# Patient Record
Sex: FEMALE | Race: WHITE | NOT HISPANIC OR LATINO | Employment: OTHER | URBAN - NONMETROPOLITAN AREA
[De-identification: names, ages, dates, MRNs, and addresses within clinical notes are randomized per-mention and may not be internally consistent; named-entity substitution may affect disease eponyms.]

---

## 2018-05-21 ENCOUNTER — OUTSIDE FACILITY SERVICE (OUTPATIENT)
Dept: CARDIOLOGY | Facility: CLINIC | Age: 73
End: 2018-05-21

## 2018-05-21 PROCEDURE — 93227 XTRNL ECG REC<48 HR R&I: CPT | Performed by: INTERNAL MEDICINE

## 2018-06-01 ENCOUNTER — OFFICE VISIT (OUTPATIENT)
Dept: CARDIOLOGY | Facility: CLINIC | Age: 73
End: 2018-06-01

## 2018-06-01 VITALS
HEART RATE: 63 BPM | BODY MASS INDEX: 32.96 KG/M2 | SYSTOLIC BLOOD PRESSURE: 136 MMHG | OXYGEN SATURATION: 98 % | WEIGHT: 210 LBS | DIASTOLIC BLOOD PRESSURE: 82 MMHG | HEIGHT: 67 IN

## 2018-06-01 DIAGNOSIS — E78.00 HYPERCHOLESTEROLEMIA: ICD-10-CM

## 2018-06-01 DIAGNOSIS — R94.31 ABNORMAL EKG: Primary | ICD-10-CM

## 2018-06-01 DIAGNOSIS — I47.1 SUPRAVENTRICULAR TACHYCARDIA (HCC): ICD-10-CM

## 2018-06-01 DIAGNOSIS — I47.29 NONSUSTAINED VENTRICULAR TACHYCARDIA (HCC): ICD-10-CM

## 2018-06-01 DIAGNOSIS — R06.09 DYSPNEA ON EXERTION: ICD-10-CM

## 2018-06-01 PROCEDURE — 99204 OFFICE O/P NEW MOD 45 MIN: CPT | Performed by: NURSE PRACTITIONER

## 2018-06-01 PROCEDURE — 93000 ELECTROCARDIOGRAM COMPLETE: CPT | Performed by: NURSE PRACTITIONER

## 2018-06-01 NOTE — PROGRESS NOTES
Subjective:     Encounter Date:06/01/2018    Chief Complaint:    Patient ID: Ruth Ramsay is a 72 y.o. female referred here today by Pema Kong NP for cardiac evaluation of shortness of air and an abnormal Holter monitor.    Shortness of Breath   This is a new problem. The current episode started more than 1 year ago. The problem occurs intermittently. The problem has been gradually worsening. Associated symptoms include headaches and neck pain. Pertinent negatives include no abdominal pain, chest pain, fever, leg swelling, rash or sputum production. The symptoms are aggravated by exercise. The patient has no known risk factors for DVT/PE. She has tried nothing for the symptoms.   Heart Problem   This is a new problem. The current episode started 1 to 4 weeks ago. The problem occurs intermittently. The problem has been unchanged. Associated symptoms include headaches and neck pain. Pertinent negatives include no abdominal pain, chest pain, chills, congestion, coughing, fever, rash or weakness. Nothing aggravates the symptoms. She has tried nothing for the symptoms. The treatment provided no relief.       HPI     Shortness of Breath    Additional comments: mild, with exertion           Irregular Heart Beat    Additional comments: Holter monitor with nonsustained VT and nonsustained SVT and atrial and ventricular premature beats       Last edited by KERLINE Neville on 6/1/2018  9:39 AM. (History)        History:   Past Medical History:   Diagnosis Date   • Acid reflux    • Arrhythmia    • Cardiac arrhythmia    • Cataract    • Dizziness and giddiness    • Hypercholesterolemia    • Nonsustained ventricular tachycardia    • Supraventricular tachycardia      Past Surgical History:   Procedure Laterality Date   • BUNIONECTOMY     • CATARACT EXTRACTION, BILATERAL     • FINGER/THUMB ARTHROPLASTY      thumb surgery   • HYSTERECTOMY     • REPLACEMENT TOTAL KNEE Right      Social History     Social History    • Marital status:      Spouse name: N/A   • Number of children: N/A   • Years of education: N/A     Occupational History   • Not on file.     Social History Main Topics   • Smoking status: Former Smoker     Packs/day: 1.00     Years: 12.00     Types: Cigarettes     Start date: 1966     Quit date: 6/1/1978   • Smokeless tobacco: Never Used      Comment: exposed to 2nd hand smoke at work   • Alcohol use 1.8 oz/week     2 Cans of beer, 1 Shots of liquor per week   • Drug use: No   • Sexual activity: Not Currently     Partners: Male     Other Topics Concern   • Not on file     Social History Narrative   • No narrative on file     Family History   Problem Relation Age of Onset   • Kidney disease Mother    • COPD Brother    • Lung cancer Maternal Aunt    • Dementia Maternal Grandmother    • Allergic rhinitis Brother    • Osteoarthritis Brother        Outpatient Prescriptions Marked as Taking for the 6/1/18 encounter (Office Visit) with KERLINE Neville   Medication Sig Dispense Refill   • aspirin 81 MG EC tablet Take 81 mg by mouth Daily.     • calcium carbonate-cholecalciferol (CALCIUM 500 +D) 500-400 MG-UNIT tablet tablet Take  by mouth Daily.     • Cholecalciferol (VITAMIN D3) 1000 units capsule Take 1,000 Units by mouth Daily.     • clonazePAM (KlonoPIN) 0.5 MG tablet Take 0.5 mg by mouth 3 (Three) Times a Day As Needed for Anxiety.     • coenzyme Q10 100 MG capsule Take 200 mg by mouth Daily.     • diclofenac sodium (VOTAREN XR) 100 MG 24 hr tablet Take 100 mg by mouth Daily.     • NON FORMULARY Daily. Joint Advantage Gold     • Omega-3 Fatty Acids (FISH OIL) 1200 MG capsule delayed-release Take 2,400 mg by mouth 2 (Two) Times a Day.     • omeprazole (priLOSEC) 40 MG capsule Take 40 mg by mouth Daily.     • polyethylene glycol (MIRALAX) powder Take 17 g by mouth Daily.     • Probiotic Product (PROBIOTIC FORMULA PO) Take 1 capsule by mouth Daily.     • raNITIdine (ZANTAC) 150 MG tablet Take 150 mg by  "mouth Every Night.     • simvastatin (ZOCOR) 20 MG tablet Take 20 mg by mouth Every Night.     • vitamin B-6 (PYRIDOXINE) 50 MG tablet Take 50 mg by mouth Daily.     • vitamin E 200 UNIT capsule Take 200 Units by mouth Daily.         Review of Systems:  Review of Systems   Constitution: Positive for malaise/fatigue. Negative for chills, decreased appetite, fever and weakness.   HENT: Negative for congestion and nosebleeds.    Eyes: Negative for blurred vision and double vision.   Cardiovascular: Positive for dyspnea on exertion. Negative for chest pain, irregular heartbeat, leg swelling, near-syncope and palpitations.   Respiratory: Positive for shortness of breath. Negative for cough and sputum production.    Endocrine: Negative for cold intolerance and heat intolerance.   Hematologic/Lymphatic: Bruises/bleeds easily.   Skin: Negative for dry skin, itching and rash.   Musculoskeletal: Positive for back pain, joint pain and neck pain. Negative for muscle cramps.   Gastrointestinal: Negative for abdominal pain, constipation, diarrhea, heartburn and melena.   Genitourinary: Positive for frequency. Negative for dysuria and hematuria.   Neurological: Positive for dizziness, headaches, light-headedness and loss of balance.   Psychiatric/Behavioral: Positive for memory loss (mild, short term). Negative for depression. The patient has insomnia. The patient is not nervous/anxious.             Objective:   /82 (BP Location: Left arm, Patient Position: Sitting, Cuff Size: Adult) Comment: little nervous  Pulse 63   Ht 170.2 cm (67\")   Wt 95.3 kg (210 lb)   SpO2 98%   BMI 32.89 kg/m²   Wt Readings from Last 3 Encounters:   05/22/18 96.6 kg (213 lb)   06/01/18 95.3 kg (210 lb)         Physical Exam   Constitutional: She is oriented to person, place, and time. She appears well-developed and well-nourished.   HENT:   Head: Normocephalic and atraumatic.   Right Ear: External ear normal.   Left Ear: External ear normal. "   Nose: Nose normal.   Mouth/Throat: Oropharynx is clear and moist.   Eyes: Conjunctivae and EOM are normal. Pupils are equal, round, and reactive to light. Right eye exhibits no discharge. Left eye exhibits no discharge. No scleral icterus.   Neck: Normal range of motion. Neck supple. No JVD present. No tracheal deviation present. No thyromegaly present.   thick   Cardiovascular: Normal rate and regular rhythm.  Exam reveals no gallop and no friction rub.    No murmur heard.  Pulmonary/Chest: Effort normal and breath sounds normal. She has no wheezes. She has no rales.   Abdominal: Soft. Bowel sounds are normal. She exhibits no mass. There is no tenderness. There is no rebound and no guarding.   Musculoskeletal: She exhibits edema (trace BLEs). She exhibits no tenderness or deformity.   Lymphadenopathy:     She has no cervical adenopathy.   Neurological: She is alert and oriented to person, place, and time. No cranial nerve deficit.   Skin: Skin is warm and dry.   Psychiatric: She has a normal mood and affect. Her behavior is normal. Judgment and thought content normal.   Vitals reviewed.      Lab/Diagnostics Review:   05/21/2018 48 hour Holter monitor occasional atrial ectopic beats with runs of nonsustained supraventricular tachycardia and occasional ventricular ectopic beats with runs of nonsustained VT up to 4 seconds.  Patient symptoms associated with sinus rhythm.    05/21/2018 ultrasound, thyroid total of 2 solid nodules in the right thyroid lobe most likely representing benign nodules.  Recommended follow-up ultrasound in 6 months to one year to assess for stability.  As read by Dr. Mauro Rm    5/9/2018 ultrasound carotid bilateral carotid system demonstrates mild without stenosis of the internal carotid artery, left carotid system demonstrates moderate plaque without stenosis of the internal carotid artery.  Vertebral was antegrade flow.  Mass in the right lobe of the thyroid.  Intermittent cardiac  arrhythmia noted.    3/14/2018  EKG  sinus rhythm 72 bpm with a deviation, QRS and 40 ms, left bundle branch block versus intraventricular conduction delay.  Poor quality copy , possible old anteroseptal infarct..    No labs are available for review but will be requested      ECG 12 Lead  Date/Time: 6/1/2018 9:59 AM  Performed by: JEANETTE AREVALO  Authorized by: JEANETTE AREVALO   Comparison: compared with previous ECG from 3/14/2018  Similar to previous ECG  Rhythm: sinus bradycardia  Rate: bradycardic  BPM: 57  Conduction: non-specific intraventricular conduction delay  QRS axis: left  Clinical impression: abnormal ECG  Clinical impression comment: Probable old anteroseptal infarct                  Assessment/Plan:         Problem List Items Addressed This Visit        Cardiovascular and Mediastinum    Nonsustained ventricular tachycardia    Overview     Nonsustained up to 4 beats per 5/2018 Holter         Current Assessment & Plan     Check nuclear stress test to evaluate for possible ischemia         Relevant Orders    Stress Test With Myocardial Perfusion One Day    Supraventricular tachycardia    Overview     Nonsustained per 5/2018 Holter         Current Assessment & Plan     asymptomatic         Abnormal EKG - Primary    Current Assessment & Plan     Possible old anteroseptal MI, nonspecific IV block vs LBBB         Relevant Orders    ECG 12 Lead (Completed)    Stress Test With Myocardial Perfusion One Day    Hypercholesterolemia    Current Assessment & Plan     Reportedly well controlled on simvastatin.            Respiratory    Dyspnea on exertion    Current Assessment & Plan     Possible anginal equivalent vs deconditioning and/or COPD. Will consider PFTs if no systolic dysfunction or ischemia on nuclear stress test.          Relevant Orders    Stress Test With Myocardial Perfusion One Day          Return in about 2 months (around 8/1/2018) for Recheck.           Jeanette Arevalo, KERLINE, ACNP-BC,  CHFN-BC

## 2018-06-01 NOTE — ASSESSMENT & PLAN NOTE
Possible anginal equivalent vs deconditioning and/or COPD. Will consider PFTs if no systolic dysfunction or ischemia on nuclear stress test.

## 2018-06-01 NOTE — PATIENT INSTRUCTIONS
Pharmacologic Stress Electrocardiogram  Introduction  A pharmacologic stress electrocardiogram is a heart (cardiac) test that uses nuclear imaging to evaluate the blood supply to your heart. This test may also be called a pharmacologic stress electrocardiography. Pharmacologic means that a medicine is used to increase your heart rate and blood pressure.  This stress test is done to find areas of poor blood flow to the heart by determining the extent of coronary artery disease (CAD). Some people exercise on a treadmill, which naturally increases the blood flow to the heart. For those people unable to exercise on a treadmill, a medicine is used. This medicine stimulates your heart and will cause your heart to beat harder and more quickly, as if you were exercising.  Pharmacologic stress tests can help determine:  · The adequacy of blood flow to your heart during increased levels of activity in order to clear you for discharge home.  · The extent of coronary artery blockage caused by CAD.  · Your prognosis if you have suffered a heart attack.  · The effectiveness of cardiac procedures done, such as an angioplasty, which can increase the circulation in your coronary arteries.  · Causes of chest pain or pressure.  LET YOUR HEALTH CARE PROVIDER KNOW ABOUT:  · Any allergies you have.  · All medicines you are taking, including vitamins, herbs, eye drops, creams, and over-the-counter medicines.  · Previous problems you or members of your family have had with the use of anesthetics.  · Any blood disorders you have.  · Previous surgeries you have had.  · Medical conditions you have.  · Possibility of pregnancy, if this applies.  · If you are currently breastfeeding.  RISKS AND COMPLICATIONS  Generally, this is a safe procedure. However, as with any procedure, complications can occur. Possible complications include:  · You develop pain or pressure in the following areas:  ¨ Chest.  ¨ Jaw or neck.  ¨ Between your shoulder  blades.  ¨ Radiating down your left arm.  · Headache.  · Dizziness or light-headedness.  · Shortness of breath.  · Increased or irregular heartbeat.  · Low blood pressure.  · Nausea or vomiting.  · Flushing.  · Redness going up the arm and slight pain during injection of medicine.  · Heart attack (rare).  BEFORE THE PROCEDURE  · Avoid all forms of caffeine for 24 hours before your test or as directed by your health care provider. This includes coffee, tea (even decaffeinated tea), caffeinated sodas, chocolate, cocoa, and certain pain medicines.  · Follow your health care provider's instructions regarding eating and drinking before the test.  · Take your medicines as directed at regular times with water unless instructed otherwise. Exceptions may include:  ¨ If you have diabetes, ask how you are to take your insulin or pills. It is common to adjust insulin dosing the morning of the test.  ¨ If you are taking beta-blocker medicines, it is important to talk to your health care provider about these medicines well before the date of your test. Taking beta-blocker medicines may interfere with the test. In some cases, these medicines need to be changed or stopped 24 hours or more before the test.  ¨ If you wear a nitroglycerin patch, it may need to be removed prior to the test. Ask your health care provider if the patch should be removed before the test.  ¨ If you use an inhaler for any breathing condition, bring it with you to the test.  · If you are an outpatient, bring a snack so you can eat right after the stress phase of the test.  · Do not smoke for 4 hours prior to the test or as directed by your health care provider.  · Do not apply lotions, powders, creams, or oils on your chest prior to the test.  · Wear comfortable shoes and clothing.  Let your health care provider know if you were unable to complete or follow the preparations for your test.  PROCEDURE  · Multiple patches (electrodes) will be put on your chest.  If needed, small areas of your chest may be shaved to get better contact with the electrodes. Once the electrodes are attached to your body, multiple wires will be attached to the electrodes, and your heart rate will be monitored.  · An IV access will be started. A nuclear trace (isotope) is given. The isotope may be given intravenously, or it may be swallowed. Nuclear refers to several types of radioactive isotopes, and the nuclear isotope lights up the arteries so that the nuclear images are clear. The isotope is absorbed by your body. This results in low radiation exposure.  · A resting nuclear image is taken to show how your heart functions at rest.  · A medicine is given through the IV access.  · A second scan is done about 1 hour after the medicine injection and determines how your heart functions under stress.  · During this stress phase, you will be connected to an electrocardiogram machine. Your blood pressure and oxygen levels will be monitored.  What to expect after the procedure  · Your heart rate and blood pressure will be monitored after the test.  · You may return to your normal schedule, including diet, activities, and medicines, unless your health care provider tells you otherwise.  This information is not intended to replace advice given to you by your health care provider. Make sure you discuss any questions you have with your health care provider.  Document Released: 05/06/2010 Document Revised: 05/25/2017 Document Reviewed: 06/26/2017  [employer]vier Interactive Patient Education © 2017 [employer]

## 2018-06-25 ENCOUNTER — OUTSIDE FACILITY SERVICE (OUTPATIENT)
Dept: CARDIOLOGY | Facility: CLINIC | Age: 73
End: 2018-06-25

## 2018-06-25 PROCEDURE — 78452 HT MUSCLE IMAGE SPECT MULT: CPT | Performed by: INTERNAL MEDICINE

## 2018-06-25 PROCEDURE — 93018 CV STRESS TEST I&R ONLY: CPT | Performed by: INTERNAL MEDICINE

## 2018-06-27 DIAGNOSIS — R06.09 DYSPNEA ON EXERTION: ICD-10-CM

## 2018-06-27 DIAGNOSIS — I47.29 NONSUSTAINED VENTRICULAR TACHYCARDIA (HCC): ICD-10-CM

## 2018-06-27 DIAGNOSIS — R94.31 ABNORMAL EKG: ICD-10-CM

## 2018-06-28 ENCOUNTER — TELEPHONE (OUTPATIENT)
Dept: CARDIOLOGY | Facility: CLINIC | Age: 73
End: 2018-06-28

## 2018-06-28 NOTE — TELEPHONE ENCOUNTER
----- Message from Cayetano Maldonado MD sent at 6/27/2018 12:45 PM CDT -----  I called the patient and discussed results of her stress test.  I recommend starting a low-dose beta blocker and have sent this into her pharmacy.  Will discuss further testing or treatment at follow-up appointment in July.

## 2018-07-18 ENCOUNTER — TELEPHONE (OUTPATIENT)
Dept: CARDIOLOGY | Facility: CLINIC | Age: 73
End: 2018-07-18

## 2018-07-18 NOTE — TELEPHONE ENCOUNTER
She said you prescribed her metoprolol 25 mg taking 1/2 tablet bid. It causing her to be very tired with no energy. She has not been taking it for a week now. Please advise

## 2018-07-18 NOTE — TELEPHONE ENCOUNTER
If it is causing her symptoms and would recommend she continue to hold the medicine.  She can discuss alternatives at her upcoming follow-up appointment with Jeanette Mcgraw on July 30.

## 2018-07-30 ENCOUNTER — OFFICE VISIT (OUTPATIENT)
Dept: CARDIOLOGY | Facility: CLINIC | Age: 73
End: 2018-07-30

## 2018-07-30 VITALS
RESPIRATION RATE: 14 BRPM | BODY MASS INDEX: 32.8 KG/M2 | HEART RATE: 54 BPM | DIASTOLIC BLOOD PRESSURE: 88 MMHG | OXYGEN SATURATION: 98 % | WEIGHT: 209 LBS | SYSTOLIC BLOOD PRESSURE: 148 MMHG | HEIGHT: 67 IN

## 2018-07-30 DIAGNOSIS — I25.10 CORONARY ARTERY DISEASE INVOLVING NATIVE CORONARY ARTERY OF NATIVE HEART WITHOUT ANGINA PECTORIS: Primary | Chronic | ICD-10-CM

## 2018-07-30 DIAGNOSIS — I47.1 SUPRAVENTRICULAR TACHYCARDIA (HCC): Chronic | ICD-10-CM

## 2018-07-30 DIAGNOSIS — E66.9 OBESITY (BMI 30.0-34.9): Chronic | ICD-10-CM

## 2018-07-30 DIAGNOSIS — I47.29 NONSUSTAINED VENTRICULAR TACHYCARDIA (HCC): Chronic | ICD-10-CM

## 2018-07-30 DIAGNOSIS — E78.00 HYPERCHOLESTEROLEMIA: Chronic | ICD-10-CM

## 2018-07-30 PROCEDURE — 99214 OFFICE O/P EST MOD 30 MIN: CPT | Performed by: NURSE PRACTITIONER

## 2018-07-30 RX ORDER — NEBIVOLOL 5 MG/1
2.5 TABLET ORAL DAILY
Qty: 14 TABLET | Refills: 0 | COMMUNITY
Start: 2018-07-30 | End: 2018-08-13 | Stop reason: SDUPTHER

## 2018-07-30 RX ORDER — NICOTINE POLACRILEX 2 MG
1 GUM BUCCAL DAILY
COMMUNITY
End: 2021-07-30

## 2018-07-30 NOTE — ASSESSMENT & PLAN NOTE
Stable, continue aspirin and simvastatin, couldn't tolerate metoprolol. Will try Bystolic samples to see if better tolerated. Heart cath if symptomatic i.e chest discomfort.

## 2018-07-30 NOTE — ASSESSMENT & PLAN NOTE
May need to increase simvastatin - she decreased from 40 mg to 20 mg due to fear of side effects and memory loss. Recommended she play memory games and discuss memory loss with PCP.

## 2018-07-30 NOTE — ASSESSMENT & PLAN NOTE
Encouraged increased aerobic activity and healthy diet to help with weight loss. Goal BMI less than 30.

## 2018-07-30 NOTE — PROGRESS NOTES
Subjective:     Encounter Date:07/30/2018    Chief Complaint:    Patient ID: Ruth Ramsay is a 73 y.o. female here today for cardiac follow-up.    HPI     Rapid Heart Rate    Additional comments: Patient denies any irregular heart beats, or rapid heart rates.  No chest discomfort or chest pain.           Shortness of Breath    Additional comments: Is able to walk longer distances without SOA - only able to use the elliptical for 2 1/2 minutes at slow rate. Has SOA with exercising.           Coronary Artery Disease    Additional comments: mild inferior and septal ischemia on nuclear stress last month, didn't tolerate metoprolol, no angina but has JACKSON and some decrease in exercise tolerance. On aspirin and statin. Feels like statins make her memory worse.        Last edited by KERLINE Neville on 7/30/2018 10:40 AM. (History)          History:   Past Medical History:   Diagnosis Date   • Acid reflux    • Arrhythmia    • Cardiac arrhythmia    • Cataract    • Coronary artery disease 06/2018    abnormal nuclear stress (inferior and septal hypokinesis)   • Dizziness and giddiness    • Hypercholesterolemia    • Hypertension    • Nonsustained ventricular tachycardia (CMS/HCC)    • Supraventricular tachycardia (CMS/HCC)      Past Surgical History:   Procedure Laterality Date   • BUNIONECTOMY     • CATARACT EXTRACTION, BILATERAL     • FINGER/THUMB ARTHROPLASTY      thumb surgery   • HYSTERECTOMY     • REPLACEMENT TOTAL KNEE Right      Social History     Social History   • Marital status:      Spouse name: N/A   • Number of children: N/A   • Years of education: N/A     Occupational History   • Not on file.     Social History Main Topics   • Smoking status: Former Smoker     Packs/day: 1.00     Years: 12.00     Types: Cigarettes     Start date: 1966     Quit date: 6/1/1978   • Smokeless tobacco: Never Used      Comment: exposed to 2nd hand smoke at work   • Alcohol use 1.8 oz/week     2 Cans of beer, 1  Shots of liquor per week   • Drug use: No   • Sexual activity: Not Currently     Partners: Male     Other Topics Concern   • Not on file     Social History Narrative   • No narrative on file     Family History   Problem Relation Age of Onset   • Kidney disease Mother    • COPD Brother    • Lung cancer Maternal Aunt    • Dementia Maternal Grandmother    • Allergic rhinitis Brother    • Osteoarthritis Brother        Outpatient Prescriptions Marked as Taking for the 7/30/18 encounter (Office Visit) with KERLINE Neville   Medication Sig Dispense Refill   • aspirin 81 MG EC tablet Take 81 mg by mouth Daily.     • Biotin 1 MG capsule Take 1 capsule by mouth Daily.     • calcium carbonate-cholecalciferol (CALCIUM 500 +D) 500-400 MG-UNIT tablet tablet Take  by mouth Daily.     • Cholecalciferol (VITAMIN D3) 1000 units capsule Take 1,000 Units by mouth Daily.     • clonazePAM (KlonoPIN) 0.5 MG tablet Take 0.5 mg by mouth 3 (Three) Times a Day As Needed for Anxiety.     • coenzyme Q10 100 MG capsule Take 200 mg by mouth Daily.     • diclofenac sodium (VOTAREN XR) 100 MG 24 hr tablet Take 100 mg by mouth Daily.     • NON FORMULARY Daily. Joint Advantage Gold     • Omega-3 Fatty Acids (FISH OIL) 1200 MG capsule delayed-release Take 2,400 mg by mouth 2 (Two) Times a Day.     • omeprazole (priLOSEC) 40 MG capsule Take 40 mg by mouth Daily.     • polyethylene glycol (MIRALAX) powder Take 17 g by mouth Daily As Needed (constipation).     • Probiotic Product (PROBIOTIC FORMULA PO) Take 1 capsule by mouth Daily.     • raNITIdine (ZANTAC) 150 MG tablet Take 150 mg by mouth Every Night.     • simvastatin (ZOCOR) 20 MG tablet Take 20 mg by mouth Every Night.     • vitamin B-6 (PYRIDOXINE) 50 MG tablet Take 50 mg by mouth Daily.     • vitamin E 400 UNIT capsule Take 400 Units by mouth Daily.         Review of Systems:  Review of Systems   Constitution: Positive for decreased appetite. Negative for chills, fever, weakness and  "malaise/fatigue.   HENT: Negative for congestion and nosebleeds.    Eyes: Negative for blurred vision and double vision.   Cardiovascular: Positive for dyspnea on exertion. Negative for chest pain, irregular heartbeat, leg swelling, near-syncope, palpitations and syncope.   Respiratory: Positive for cough (nightly) and shortness of breath. Negative for sputum production.    Endocrine: Negative for cold intolerance and heat intolerance.   Hematologic/Lymphatic: Bruises/bleeds easily.   Skin: Negative for dry skin, itching and rash.   Musculoskeletal: Positive for joint pain. Negative for back pain, muscle cramps and neck pain.   Gastrointestinal: Negative for abdominal pain, constipation, diarrhea, heartburn and melena.   Genitourinary: Positive for frequency. Negative for dysuria and hematuria.   Neurological: Positive for dizziness, headaches, light-headedness and loss of balance. Negative for numbness.   Psychiatric/Behavioral: Positive for memory loss (mild, short term). Negative for depression. The patient has insomnia and is nervous/anxious.             Objective:   /88 (BP Location: Left arm, Patient Position: Sitting, Cuff Size: Adult)   Pulse 54   Resp 14   Ht 170.2 cm (67\")   Wt 94.8 kg (209 lb)   SpO2 98%   BMI 32.73 kg/m²   Wt Readings from Last 3 Encounters:   07/30/18 94.8 kg (209 lb)   05/22/18 96.6 kg (213 lb)   06/01/18 95.3 kg (210 lb)         Physical Exam   Constitutional: She is oriented to person, place, and time. She appears well-developed and well-nourished.   Eyes: No scleral icterus.   Neck: No JVD present.   Cardiovascular: Normal rate, regular rhythm, normal heart sounds and intact distal pulses.  Exam reveals no gallop and no friction rub.    No murmur heard.  Pulmonary/Chest: Effort normal and breath sounds normal.   Musculoskeletal: She exhibits no edema.   Neurological: She is alert and oriented to person, place, and time.   Skin: Skin is warm and dry.   Psychiatric: She " has a normal mood and affect.   Vitals reviewed.      Lab/Diagnostics Review:   05/21/2018 48 hour Holter monitor occasional atrial ectopic beats with runs of nonsustained supraventricular tachycardia and occasional ventricular ectopic beats with runs of nonsustained VT up to 4 seconds.  Patient symptoms associated with sinus rhythm.     05/21/2018 ultrasound, thyroid total of 2 solid nodules in the right thyroid lobe most likely representing benign nodules.  Recommended follow-up ultrasound in 6 months to one year to assess for stability.  As read by Dr. Mauro Rm    5/9/2018 ultrasound carotid bilateral carotid system demonstrates mild without stenosis of the internal carotid artery, left carotid system demonstrates moderate plaque without stenosis of the internal carotid artery.  Vertebral was antegrade flow.  Mass in the right lobe of the thyroid.  Intermittent cardiac arrhythmia noted.    4/30/2018   Lipid panel , , HDL 64,   Hepatic Function Panel alkp 67, ast 28, alt 38, albumin 3.9, Total bili 0.5     3/14/2018  EKG  sinus rhythm 72 bpm with a deviation, QRS and 40 ms, left bundle branch block versus intraventricular conduction delay.  Poor quality copy , possible old anteroseptal infarct..    3/15/2018  CBC WBC 4200, Hgb 14.0, Hct 42.8%, plt 211,000  CMP Na 139, K 3.8, Cl 104, CO2 26, BUN 16, Cr 0.9, total protein 6.4, albumin 3.8, alkp 80, ast 40, alt 56  Vitamin B12 >1500  TSH 1.91, Free T4 0.99, Free T3 3.05   HS-CRP 19.6  Vitamin D 60.6    ECG 12 Lead  Date/Time: 6/1/2018 9:59 AM  Performed by: PEYTON AREVALO  Authorized by: PEYTON AREVALO   Comparison: compared with previous ECG from 3/14/2018  Similar to previous ECG  Rhythm: sinus bradycardia  Rate: bradycardic  BPM: 57  Conduction: non-specific intraventricular conduction delay  QRS axis: left  Clinical impression: abnormal ECG  Clinical impression comment: Probable old anteroseptal infarct    Procedures: none in  office today        Assessment/Plan:         Problem List Items Addressed This Visit        Cardiovascular and Mediastinum    Nonsustained ventricular tachycardia (CMS/HCC) (Chronic)    Overview     Nonsustained up to 4 beats per 5/2018 Holter         Relevant Medications    nebivolol (BYSTOLIC) 5 MG tablet    Supraventricular tachycardia (CMS/HCC) (Chronic)    Overview     Nonsustained per 5/2018 Holter         Relevant Medications    nebivolol (BYSTOLIC) 5 MG tablet    Hypercholesterolemia (Chronic)    Current Assessment & Plan     May need to increase simvastatin - she decreased from 40 mg to 20 mg due to fear of side effects and memory loss. Recommended she play memory games and discuss memory loss with PCP.          Coronary artery disease - Primary (Chronic)    Overview     abnormal nuclear stress (inferior and septal hypokinesis)         Current Assessment & Plan     Stable, continue aspirin and simvastatin, couldn't tolerate metoprolol. Will try Bystolic samples to see if better tolerated. Heart cath if symptomatic i.e chest discomfort.          Relevant Medications    nebivolol (BYSTOLIC) 5 MG tablet       Digestive    Obesity (BMI 30.0-34.9) (Chronic)    Current Assessment & Plan     Encouraged increased aerobic activity and healthy diet to help with weight loss. Goal BMI less than 30.                Return in about 3 months (around 10/30/2018) for Recheck.           Jeanette Mcgraw, APRN, ACNP-BC, CHFN-BC

## 2018-07-30 NOTE — PATIENT INSTRUCTIONS
Coronary Artery Disease, Female  Coronary artery disease (CAD) is a condition in which the arteries that lead to the heart (coronary arteries) become narrow or blocked. The narrowing or blockage can lead to decreased blood flow to the heart. Prolonged reduced blood flow can cause a heart attack (myocardial infarction or MI). This condition may also be called coronary heart disease.  Because CAD is the leading cause of death in women, it is important to understand what causes this condition and how it is treated.  What are the causes?  CAD is most often caused by atherosclerosis. This is the buildup of fat and cholesterol (plaque) on the inside of the arteries. Over time, the plaque may narrow or block the artery, reducing blood flow to the heart. Plaque can also become weak and break off within a coronary artery and cause a sudden blockage. Other less common causes of CAD include:  · An embolism or blood clot in a coronary artery.  · A tearing of the artery (spontaneous coronary artery dissection).  · An aneurysm.  · Inflammation (vasculitis) in the artery wall.    What increases the risk?  The following factors may make you more likely to develop this condition:  · Age. Women over age 55 are at a greater risk of CAD.  · Family history of CAD.  · High blood pressure (hypertension).  · Diabetes.  · High cholesterol levels.  · Tobacco use.  · Lack of exercise.  · Menopause.  ? All postmenopausal women are at greater risk of CAD.  ? Women who have experienced menopause between the ages of 40-45 (early menopause) are at a higher risk of CAD.  ? Women who have experienced menopause before age 40 (premature menopause) are at a very high risk of CAD.  · Excessive alcohol use  · A diet high in saturated and trans fats, such as fried food and processed meat.    Other possible risk factors include:  · High stress levels.  · Depression  · Obesity.  · Sleep apnea.    What are the signs or symptoms?  Many people do not have any  symptoms during the early stages of CAD. As the condition progresses, symptoms may include:  · Chest pain (angina). The pain can:  ? Feel like crushing or squeezing, or a tightness, pressure, fullness, or heaviness in the chest.  ? Last more than a few minutes or can stop and recur. The pain tends to get worse with exercise or stress and to fade with rest.  · Pain in the arms, neck, jaw, or back.  · Unexplained heartburn or indigestion.  · Shortness of breath.  · Nausea.  · Sudden cold sweats.  · Sudden light-headedness.  · Fluttering or fast heartbeat (palpitations).    Many women have chest discomfort and the other symptoms. However, women often have unusual (atypical) symptoms, such as:  · Fatigue.  · Vomiting.  · Unexplained feelings of nervousness or anxiety.  · Unexplained weakness.  · Dizziness or fainting.    How is this diagnosed?  This condition is diagnosed based on:  · Your family and medical history.  · A physical exam.  · Tests, including:  ? A test to check the electrical signals in your heart (electrocardiogram).  ? Exercise stress test. This looks for signs of blockage when the heart is stressed with exercise, such as running on a treadmill.  ? Pharmacologic stress test. This test looks for signs of blockage when the heart is being stressed with a medicine.  ? Blood tests.  ? Coronary angiogram. This is a procedure to look at the coronary arteries to see if there is any blockage. During this test, a dye is injected into your arteries so they appear on an X-ray.  ? A test that uses sound waves to take a picture of your heart (echocardiogram).  ? Chest X-ray.    How is this treated?  This condition may be treated by:  · Healthy lifestyle changes to reduce risk factors.  · Medicines such as:  ? Antiplatelet medicines and blood-thinning medicines, such as aspirin. These help prevent blood clots.  ? Nitroglycerin.  ? Blood pressure medicines.  ? Cholesterol-lowering medicine.  · Coronary angioplasty and  stenting. During this procedure, a thin, flexible tube is inserted through a blood vessel and into a blocked artery. A balloon or similar device on the end of the tube is inflated to open up the artery. In some cases, a small, mesh tube (stent) is inserted into the artery to keep it open.  · Coronary artery bypass surgery. During this surgery, veins or arteries from other parts of the body are used to create a bypass around the blockage and allow blood to reach your heart.    Follow these instructions at home:  Medicines  · Take over-the-counter and prescription medicines only as told by your health care provider.  · Do not take the following medicines unless your health care provider approves:  ? NSAIDs, such as ibuprofen, naproxen, or celecoxib.  ? Vitamin supplements that contain vitamin A, vitamin E, or both.  ? Hormone replacement therapy that contains estrogen with or without progestin.  Lifestyle  · Follow an exercise program approved by your health care provider. Aim for 150 minutes of moderate exercise or 75 minutes of vigorous exercise each week.  · Maintain a healthy weight or lose weight as approved by your health care provider.  · Rest when you are tired.  · Learn to manage stress or try to limit your stress. Ask your health care provider for suggestions if you need help.  · Get screened for depression and seek treatment, if needed.  · Do not use any products that contain nicotine or tobacco, such as cigarettes and e-cigarettes. If you need help quitting, ask your health care provider.  · Do not use illegal drugs.  Eating and drinking  · Follow a heart-healthy diet. A dietitian can help educate you about healthy food options and changes. In general, eat plenty of fruits and vegetables, lean meats, and whole grains.  · Avoid foods high in:  ? Sugar.  ? Salt (sodium).  ? Saturated fats, such as processed or fatty meat.  ? Trans fats, such as fried food.  · Use healthy cooking methods such as roasting,  grilling, broiling, baking, poaching, steaming, or stir-frying.  · If you drink alcohol, and your health care provider approves, limit your alcohol intake to no more than 1 drink per day. One drink equals 12 ounces of beer, 5 ounces of wine, or 1½ ounces of hard liquor.  General instructions  · Manage any other health conditions, such as hypertension and diabetes. These conditions affect your heart.  · Your health care provider may ask you to monitor your blood pressure. Ideally, your blood pressure should be below 130/80.  · Keep all follow-up visits as told by your health care provider. This is important.  Get help right away if:  · You have pain in your chest, neck, arm, jaw, stomach, or back that:  ? Lasts more than a few minutes.  ? Is recurring.  ? Is not relieved by taking medicine under your tongue (sublingualnitroglycerin).  · You have profuse sweating without cause.  · You have unexplained:  ? Heartburn or indigestion.  ? Shortness of breath or difficulty breathing.  ? Fluttering or fast heartbeat (palpitations).  ? Nausea or vomiting.  ? Fatigue.  ? Feelings of nervousness or anxiety.  ? Weakness.  ? Diarrhea.  · You have sudden light-headedness or dizziness.  · You faint.  · You feel like hurting yourself or think about taking your own life.  These symptoms may represent a serious problem that is an emergency. Do not wait to see if the symptoms will go away. Get medical help right away. Call your local emergency services (911 in the [place] Do not drive yourself to the hospital.  Summary  · Coronary artery disease (CAD) is a process in which the arteries that lead to the heart (coronary arteries) become narrow or blocked. The narrowing or blockage can lead to a heart attack.  · Many women have chest discomfort and other common symptoms of CAD. However, women often have different (atypical) symptoms, such as fatigue, vomiting, and dizziness or weakness.  · CAD can be treated with lifestyle changes,  medicines, surgery, or a combination of these treatments.  This information is not intended to replace advice given to you by your health care provider. Make sure you discuss any questions you have with your health care provider.  Document Released: 03/11/2013 Document Revised: 12/08/2017 Document Reviewed: 12/08/2017  [employer] Interactive Patient Education © 2018 [employer]

## 2018-08-13 DIAGNOSIS — I47.1 SUPRAVENTRICULAR TACHYCARDIA (HCC): Chronic | ICD-10-CM

## 2018-08-13 DIAGNOSIS — I47.29 NONSUSTAINED VENTRICULAR TACHYCARDIA (HCC): Chronic | ICD-10-CM

## 2018-08-13 DIAGNOSIS — I25.10 CORONARY ARTERY DISEASE INVOLVING NATIVE CORONARY ARTERY OF NATIVE HEART WITHOUT ANGINA PECTORIS: Chronic | ICD-10-CM

## 2018-08-13 RX ORDER — NEBIVOLOL 5 MG/1
2.5 TABLET ORAL DAILY
Qty: 45 TABLET | Refills: 3 | Status: SHIPPED | OUTPATIENT
Start: 2018-08-13 | End: 2018-10-23 | Stop reason: SDUPTHER

## 2018-08-13 NOTE — TELEPHONE ENCOUNTER
Pt called and stated that the bystolic  Seems to be working and that she has had no symptoms and wanted a refill sent to pharmacy

## 2018-08-20 ENCOUNTER — TELEPHONE (OUTPATIENT)
Dept: CARDIOLOGY | Facility: CLINIC | Age: 73
End: 2018-08-20

## 2018-10-23 ENCOUNTER — OFFICE VISIT (OUTPATIENT)
Dept: CARDIOLOGY | Facility: CLINIC | Age: 73
End: 2018-10-23

## 2018-10-23 VITALS
HEIGHT: 67 IN | DIASTOLIC BLOOD PRESSURE: 82 MMHG | HEART RATE: 52 BPM | OXYGEN SATURATION: 96 % | WEIGHT: 204.6 LBS | BODY MASS INDEX: 32.11 KG/M2 | SYSTOLIC BLOOD PRESSURE: 138 MMHG

## 2018-10-23 DIAGNOSIS — E66.9 OBESITY (BMI 30.0-34.9): Chronic | ICD-10-CM

## 2018-10-23 DIAGNOSIS — R00.1 BRADYCARDIA: ICD-10-CM

## 2018-10-23 DIAGNOSIS — I47.29 NONSUSTAINED VENTRICULAR TACHYCARDIA (HCC): Chronic | ICD-10-CM

## 2018-10-23 DIAGNOSIS — I47.1 SUPRAVENTRICULAR TACHYCARDIA (HCC): Chronic | ICD-10-CM

## 2018-10-23 DIAGNOSIS — E78.00 HYPERCHOLESTEROLEMIA: Chronic | ICD-10-CM

## 2018-10-23 DIAGNOSIS — I25.10 CORONARY ARTERY DISEASE INVOLVING NATIVE CORONARY ARTERY OF NATIVE HEART WITHOUT ANGINA PECTORIS: Primary | Chronic | ICD-10-CM

## 2018-10-23 PROCEDURE — 99214 OFFICE O/P EST MOD 30 MIN: CPT | Performed by: NURSE PRACTITIONER

## 2018-10-23 RX ORDER — ZINC GLUCONATE 50 MG
50 TABLET ORAL DAILY
COMMUNITY
End: 2021-07-30

## 2018-10-23 RX ORDER — NEBIVOLOL 2.5 MG/1
1.25 TABLET ORAL DAILY
Qty: 45 TABLET | Refills: 3 | Status: SHIPPED | OUTPATIENT
Start: 2018-10-23 | End: 2021-07-30

## 2018-10-23 RX ORDER — MONTELUKAST SODIUM 10 MG/1
10 TABLET ORAL
COMMUNITY
End: 2021-07-30

## 2018-10-23 RX ORDER — MULTIVITAMIN WITH IRON
100 TABLET ORAL DAILY
COMMUNITY
End: 2021-07-30

## 2018-10-23 RX ORDER — NITROFURANTOIN MACROCRYSTALS 100 MG/1
1 CAPSULE ORAL 2 TIMES DAILY
COMMUNITY
End: 2021-07-30

## 2018-10-23 NOTE — ASSESSMENT & PLAN NOTE
May need to increase simvastatin back to 40 mg (pt decreased to 20 mg due to fear of side effects and perceived memory loss)

## 2018-10-23 NOTE — PROGRESS NOTES
Subjective:     Encounter Date:10/23/2018    Chief Complaint:    Patient ID: Ruth Ramsay is a 73 y.o. female here today for 3 month cardiac follow-up. She is accompanied by her .    HPI     Coronary Artery Disease    Additional comments: mild inferior and septal ischemia on nuclear stress 6/2018, didn't tolerate metoprolol, tolerating aspirin, nebivolol, and statin.            Shortness of Breath    Additional comments: now up to 8 minutes between 3-4 rpm on the elliptical almost every day of the week, has helped JACKSON           Rapid Heart Rate    Additional comments: has never felt palpitations, resting heart rates running 50-60s           Hypertension    Additional comments: running 120-130s/60s       Last edited by Jeanette Mcgraw APRN on 10/23/2018 11:42 AM. (History)        History:   Past Medical History:   Diagnosis Date   • Acid reflux    • Arrhythmia    • Cardiac arrhythmia    • Cataract    • Coronary artery disease 06/2018    abnormal nuclear stress (inferior and septal hypokinesis)   • Dizziness and giddiness    • Hypercholesterolemia    • Hypertension    • Nonsustained ventricular tachycardia (CMS/HCC)    • Supraventricular tachycardia (CMS/HCC)      Past Surgical History:   Procedure Laterality Date   • BUNIONECTOMY     • CATARACT EXTRACTION, BILATERAL     • FINGER/THUMB ARTHROPLASTY      thumb surgery   • HYSTERECTOMY     • REPLACEMENT TOTAL KNEE Right      Social History     Social History   • Marital status:      Spouse name: N/A   • Number of children: N/A   • Years of education: N/A     Occupational History   • Not on file.     Social History Main Topics   • Smoking status: Former Smoker     Packs/day: 1.00     Years: 12.00     Types: Cigarettes     Start date: 1966     Quit date: 6/1/1978   • Smokeless tobacco: Never Used      Comment: exposed to 2nd hand smoke at work   • Alcohol use 1.2 oz/week     2 Cans of beer per week      Comment: OCC    • Drug use: No   • Sexual  activity: Not Currently     Partners: Male     Other Topics Concern   • Not on file     Social History Narrative   • No narrative on file     Family History   Problem Relation Age of Onset   • Kidney disease Mother    • COPD Brother    • Lung cancer Maternal Aunt    • Dementia Maternal Grandmother    • Allergic rhinitis Brother    • Osteoarthritis Brother        Outpatient Prescriptions Marked as Taking for the 10/23/18 encounter (Office Visit) with Jeanette Mcgraw APRN   Medication Sig Dispense Refill   • aspirin 81 MG EC tablet Take 81 mg by mouth Daily.     • calcium carbonate-cholecalciferol (CALCIUM 500 +D) 500-400 MG-UNIT tablet tablet Take 2 tablets by mouth Daily. Taking 1200 mg w/D, 1000 IU     • Cholecalciferol (VITAMIN D3) 1000 units capsule Take 1,000 Units by mouth Daily.     • clonazePAM (KlonoPIN) 0.5 MG tablet Take 0.5 mg by mouth 3 (Three) Times a Day As Needed for Anxiety.     • coenzyme Q10 100 MG capsule Take 100 mg by mouth 2 (Two) Times a Day.     • diclofenac sodium (VOTAREN XR) 100 MG 24 hr tablet Take 50 mg by mouth Daily.     • montelukast (SINGULAIR) 10 MG tablet Take 10 mg by mouth every night at bedtime.     • Multiple Vitamins-Minerals (HAIR SKIN AND NAILS FORMULA PO) Take 1 capsule by mouth Daily.     • nebivolol (BYSTOLIC) 2.5 MG tablet Take 0.5 tablets by mouth Daily. 45 tablet 3   • nitrofurantoin (MACRODANTIN) 100 MG capsule Take 1 capsule by mouth 2 (Two) Times a Day.     • NON FORMULARY Take 1 tablet by mouth 2 (Two) Times a Day. Joint Advantage Gold      • Omega-3 Fatty Acids (FISH OIL) 1200 MG capsule delayed-release Take 2,400 mg by mouth 2 (Two) Times a Day.     • omeprazole (priLOSEC) 40 MG capsule Take 40 mg by mouth Daily.     • polyethylene glycol (MIRALAX) powder Take 17 g by mouth Daily As Needed (constipation).     • Probiotic Product (PROBIOTIC FORMULA PO) Take 1 capsule by mouth Daily.     • raNITIdine (ZANTAC) 150 MG tablet Take 300 mg by mouth Every Night.    "  • simvastatin (ZOCOR) 20 MG tablet Take 20 mg by mouth Every Night.     • triamcinolone (KENALOG) 0.1 % ointment Apply 1 application topically to the appropriate area as directed 2 (Two) Times a Day As Needed.     • vitamin B-6 (PYRIDOXINE) 100 MG tablet Take 100 mg by mouth Daily.     • Zinc 50 MG tablet Take 50 mg by mouth Daily.     • [DISCONTINUED] nebivolol (BYSTOLIC) 5 MG tablet Take 0.5 tablets by mouth Daily. (Patient taking differently: Take 2.5 mg by mouth Daily. Buying from [employer] due to \"cost\") 45 tablet 3   • [DISCONTINUED] vitamin B-6 (PYRIDOXINE) 50 MG tablet Take 50 mg by mouth Daily.     • [DISCONTINUED] vitamin E 400 UNIT capsule Take 400 Units by mouth Daily.         Review of Systems:  Review of Systems   Constitution: Positive for decreased appetite, weakness and malaise/fatigue. Negative for chills and fever.   HENT: Negative for congestion and nosebleeds.         Altered sense of smell - everything smells awful except sugar   Eyes: Positive for blurred vision. Negative for double vision.   Cardiovascular: Negative for chest pain, dyspnea on exertion, irregular heartbeat, leg swelling, near-syncope, palpitations and syncope.   Respiratory: Positive for cough (nightly). Negative for shortness of breath, sputum production and wheezing.    Endocrine: Positive for heat intolerance. Negative for cold intolerance.   Hematologic/Lymphatic: Bruises/bleeds easily.   Skin: Positive for dry skin. Negative for itching and rash.   Musculoskeletal: Positive for arthritis, back pain, joint pain and joint swelling. Negative for falls, muscle cramps, neck pain and stiffness.   Gastrointestinal: Positive for nausea. Negative for abdominal pain, constipation, diarrhea, heartburn, melena and vomiting.   Genitourinary: Positive for frequency. Negative for dysuria and hematuria.   Neurological: Positive for dizziness, light-headedness and loss of balance. Negative for excessive daytime sleepiness, headaches and " "numbness.   Psychiatric/Behavioral: Positive for depression and memory loss (mild, short term). The patient has insomnia and is nervous/anxious.           Objective:   /82 (BP Location: Left arm, Patient Position: Sitting, Cuff Size: Adult)   Pulse 52   Ht 170.2 cm (67\")   Wt 92.8 kg (204 lb 9.6 oz)   SpO2 96%   BMI 32.04 kg/m²   Wt Readings from Last 3 Encounters:   10/23/18 92.8 kg (204 lb 9.6 oz)   07/30/18 94.8 kg (209 lb)   05/22/18 96.6 kg (213 lb)         Physical Exam   Constitutional: She is oriented to person, place, and time. She appears well-developed and well-nourished.   Eyes: No scleral icterus.   Neck: No JVD present.   Cardiovascular: Normal rate, regular rhythm, normal heart sounds and intact distal pulses.  Exam reveals no gallop and no friction rub.    No murmur heard.  Pulmonary/Chest: Effort normal and breath sounds normal.   Musculoskeletal: She exhibits no edema.   Neurological: She is alert and oriented to person, place, and time.   Skin: Skin is warm and dry.   Psychiatric: She has a normal mood and affect.   Vitals reviewed.    Lab/Diagnostics Review:   6/25/2018 Stress sestamibi  . Mildly diminished perfusion involving the inferior wall and septum near the apex reperfuses at rest consistent with mild inferior and septal ischemia, EF 66%, as read by Dr. Sha Palafox    05/21/2018 48 hour Holter monitor occasional atrial ectopic beats with runs of nonsustained supraventricular tachycardia and occasional ventricular ectopic beats with runs of nonsustained VT up to 4 seconds.  Patient symptoms associated with sinus rhythm.     05/21/2018 ultrasound, thyroid total of 2 solid nodules in the right thyroid lobe most likely representing benign nodules.  Recommended follow-up ultrasound in 6 months to one year to assess for stability.  As read by Dr. Mauro Rm     5/9/2018 ultrasound carotid bilateral carotid system demonstrates mild without stenosis of the internal carotid artery, left " carotid system demonstrates moderate plaque without stenosis of the internal carotid artery.  Vertebral was antegrade flow.  Mass in the right lobe of the thyroid.  Intermittent cardiac arrhythmia noted.     4/30/2018   Lipid panel , , HDL 64,   Hepatic Function Panel alkp 67, ast 28, alt 38, albumin 3.9, Total bili 0.5      3/14/2018  EKG  sinus rhythm 72 bpm with a deviation, QRS and 40 ms, left bundle branch block versus intraventricular conduction delay.  Poor quality copy, possible old anteroseptal infarct..     3/15/2018  CBC WBC 4200, Hgb 14.0, Hct 42.8%, plt 211,000  CMP Na 139, K 3.8, Cl 104, CO2 26, BUN 16, Cr 0.9, total protein 6.4, albumin 3.8, alkp 80, ast 40, alt 56  Vitamin B12 >1500  TSH 1.91, Free T4 0.99, Free T3 3.05   HS-CRP 19.6  Vitamin D 60.6     ECG 12 Lead  Date/Time: 6/1/2018 9:59 AM  Performed by: PEYTON AREVALO  Authorized by: PEYTON AREVALO   Comparison: compared with previous ECG from 3/14/2018  Similar to previous ECG  Rhythm: sinus bradycardia  Rate: bradycardic  BPM: 57  Conduction: non-specific intraventricular conduction delay  QRS axis: left  Clinical impression: abnormal ECG  Clinical impression comment: Probable old anteroseptal infarct    Procedures: none in office today        Assessment/Plan:         Problem List Items Addressed This Visit        Cardiovascular and Mediastinum    Nonsustained ventricular tachycardia (CMS/HCC) (Chronic)    Overview     Nonsustained up to 4 beats per 5/2018 Holter         Current Assessment & Plan     Stable on low dose beta-blocker         Relevant Medications    nebivolol (BYSTOLIC) 2.5 MG tablet    Supraventricular tachycardia (CMS/HCC) (Chronic)    Overview     Nonsustained per 5/2018 Holter         Current Assessment & Plan     Stable on low dose beta-blocker         Relevant Medications    nebivolol (BYSTOLIC) 2.5 MG tablet    Hypercholesterolemia (Chronic)    Current Assessment & Plan     May need to  increase simvastatin back to 40 mg (pt decreased to 20 mg due to fear of side effects and perceived memory loss)         Coronary artery disease - Primary (Chronic)    Overview     abnormal nuclear stress 6/2018 (mild inferior and septal hypokinesis)         Current Assessment & Plan     Stable, continue aspirin and simvastatin (couldn't tolerate metoprolol). Will see if she qualifies for patient assistance for Bystolic. Decrease nebivolol to 1.25 mg daily due to fatigue and low grade bradycardia. To monitor BP at home and call if running greater than 130/80.         Relevant Medications    nebivolol (BYSTOLIC) 2.5 MG tablet    Bradycardia    Current Assessment & Plan     Low grade but having some fatigue. Can try decreasing nebivolol to 1.25 mg daily.            Other    Obesity (BMI 30.0-34.9) (Chronic)    Current Assessment & Plan     Improving, encouraged healthy diet and continued gradual increase in routine aerobic exercise             Return in about 6 months (around 4/23/2019) for Recheck.           Jeanette Mcgraw, APRN, ACNP-BC, CHFN-BC

## 2018-10-23 NOTE — ASSESSMENT & PLAN NOTE
Stable, continue aspirin and simvastatin (couldn't tolerate metoprolol). Will see if she qualifies for patient assistance for Bystolic. Decrease nebivolol to 1.25 mg daily due to fatigue and low grade bradycardia. To monitor BP at home and call if running greater than 130/80.

## 2018-10-23 NOTE — PATIENT INSTRUCTIONS
Exercising to Lose Weight  Exercising can help you to lose weight. In order to lose weight through exercise, you need to do vigorous-intensity exercise. You can tell that you are exercising with vigorous intensity if you are breathing very hard and fast and cannot hold a conversation while exercising.  Moderate-intensity exercise helps to maintain your current weight. You can tell that you are exercising at a moderate level if you have a higher heart rate and faster breathing, but you are still able to hold a conversation.  How often should I exercise?  Choose an activity that you enjoy and set realistic goals. Your health care provider can help you to make an activity plan that works for you. Exercise regularly as directed by your health care provider. This may include:  · Doing resistance training twice each week, such as:  ? Push-ups.  ? Sit-ups.  ? Lifting weights.  ? Using resistance bands.  · Doing a given intensity of exercise for a given amount of time. Choose from these options:  ? 150 minutes of moderate-intensity exercise every week.  ? 75 minutes of vigorous-intensity exercise every week.  ? A mix of moderate-intensity and vigorous-intensity exercise every week.    Children, pregnant women, people who are out of shape, people who are overweight, and older adults may need to consult a health care provider for individual recommendations. If you have any sort of medical condition, be sure to consult your health care provider before starting a new exercise program.  What are some activities that can help me to lose weight?  · Walking at a rate of at least 4.5 miles an hour.  · Jogging or running at a rate of 5 miles per hour.  · Biking at a rate of at least 10 miles per hour.  · Lap swimming.  · Roller-skating or in-line skating.  · Cross-country skiing.  · Vigorous competitive sports, such as football, basketball, and soccer.  · Jumping rope.  · Aerobic dancing.  How can I be more active in my day-to-day  activities?  · Use the stairs instead of the elevator.  · Take a walk during your lunch break.  · If you drive, park your car farther away from work or school.  · If you take public transportation, get off one stop early and walk the rest of the way.  · Make all of your phone calls while standing up and walking around.  · Get up, stretch, and walk around every 30 minutes throughout the day.  What guidelines should I follow while exercising?  · Do not exercise so much that you hurt yourself, feel dizzy, or get very short of breath.  · Consult your health care provider prior to starting a new exercise program.  · Wear comfortable clothes and shoes with good support.  · Drink plenty of water while you exercise to prevent dehydration or heat stroke. Body water is lost during exercise and must be replaced.  · Work out until you breathe faster and your heart beats faster.  This information is not intended to replace advice given to you by your health care provider. Make sure you discuss any questions you have with your health care provider.  Document Released: 01/20/2012 Document Revised: 05/25/2017 Document Reviewed: 05/21/2015  [employer] Interactive Patient Education © 2018 [employer]  Calorie Counting for Weight Loss  Calories are units of energy. Your body needs a certain amount of calories from food to keep you going throughout the day. When you eat more calories than your body needs, your body stores the extra calories as fat. When you eat fewer calories than your body needs, your body burns fat to get the energy it needs.  Calorie counting means keeping track of how many calories you eat and drink each day. Calorie counting can be helpful if you need to lose weight. If you make sure to eat fewer calories than your body needs, you should lose weight. Ask your health care provider what a healthy weight is for you.  For calorie counting to work, you will need to eat the right number of calories in a day in order to  lose a healthy amount of weight per week. A dietitian can help you determine how many calories you need in a day and will give you suggestions on how to reach your calorie goal.  · A healthy amount of weight to lose per week is usually 1-2 lb (0.5-0.9 kg). This usually means that your daily calorie intake should be reduced by 500-750 calories.  · Eating 1,200 - 1,500 calories per day can help most women lose weight.  · Eating 1,500 - 1,800 calories per day can help most men lose weight.    What do I need to know about calorie counting?  In order to meet your daily calorie goal, you will need to:  · Find out how many calories are in each food you would like to eat. Try to do this before you eat.  · Decide how much of the food you plan to eat.  · Write down what you ate and how many calories it had. Doing this is called keeping a food log.    To successfully lose weight, it is important to balance calorie counting with a healthy lifestyle that includes regular activity. Aim for 150 minutes of moderate exercise (such as walking) or 75 minutes of vigorous exercise (such as running) each week.  Where do I find calorie information?    The number of calories in a food can be found on a Nutrition Facts label. If a food does not have a Nutrition Facts label, try to look up the calories online or ask your dietitian for help.  Remember that calories are listed per serving. If you choose to have more than one serving of a food, you will have to multiply the calories per serving by the amount of servings you plan to eat. For example, the label on a package of bread might say that a serving size is 1 slice and that there are 90 calories in a serving. If you eat 1 slice, you will have eaten 90 calories. If you eat 2 slices, you will have eaten 180 calories.  How do I keep a food log?  Immediately after each meal, record the following information in your food log:  · What you ate. Don't forget to include toppings, sauces, and other  "extras on the food.  · How much you ate. This can be measured in cups, ounces, or number of items.  · How many calories each food and drink had.  · The total number of calories in the meal.    Keep your food log near you, such as in a small notebook in your pocket, or use a mobile leslee or website. Some programs will calculate calories for you and show you how many calories you have left for the day to meet your goal.  What are some calorie counting tips?  · Use your calories on foods and drinks that will fill you up and not leave you hungry:  ? Some examples of foods that fill you up are nuts and nut butters, vegetables, lean proteins, and high-fiber foods like whole grains. High-fiber foods are foods with more than 5 g fiber per serving.  ? Drinks such as sodas, specialty coffee drinks, alcohol, and juices have a lot of calories, yet do not fill you up.  · Eat nutritious foods and avoid empty calories. Empty calories are calories you get from foods or beverages that do not have many vitamins or protein, such as candy, sweets, and soda. It is better to have a nutritious high-calorie food (such as an avocado) than a food with few nutrients (such as a bag of chips).  · Know how many calories are in the foods you eat most often. This will help you calculate calorie counts faster.  · Pay attention to calories in drinks. Low-calorie drinks include water and unsweetened drinks.  · Pay attention to nutrition labels for \"low fat\" or \"fat free\" foods. These foods sometimes have the same amount of calories or more calories than the full fat versions. They also often have added sugar, starch, or salt, to make up for flavor that was removed with the fat.  · Find a way of tracking calories that works for you. Get creative. Try different apps or programs if writing down calories does not work for you.  What are some portion control tips?  · Know how many calories are in a serving. This will help you know how many servings of a " certain food you can have.  · Use a measuring cup to measure serving sizes. You could also try weighing out portions on a kitchen scale. With time, you will be able to estimate serving sizes for some foods.  · Take some time to put servings of different foods on your favorite plates, bowls, and cups so you know what a serving looks like.  · Try not to eat straight from a bag or box. Doing this can lead to overeating. Put the amount you would like to eat in a cup or on a plate to make sure you are eating the right portion.  · Use smaller plates, glasses, and bowls to prevent overeating.  · Try not to multitask (for example, watch TV or use your computer) while eating. If it is time to eat, sit down at a table and enjoy your food. This will help you to know when you are full. It will also help you to be aware of what you are eating and how much you are eating.  What are tips for following this plan?  Reading food labels  · Check the calorie count compared to the serving size. The serving size may be smaller than what you are used to eating.  · Check the source of the calories. Make sure the food you are eating is high in vitamins and protein and low in saturated and trans fats.  Shopping  · Read nutrition labels while you shop. This will help you make healthy decisions before you decide to purchase your food.  · Make a grocery list and stick to it.  Cooking  · Try to cook your favorite foods in a healthier way. For example, try baking instead of frying.  · Use low-fat dairy products.  Meal planning  · Use more fruits and vegetables. Half of your plate should be fruits and vegetables.  · Include lean proteins like poultry and fish.  How do I count calories when eating out?  · Ask for smaller portion sizes.  · Consider sharing an entree and sides instead of getting your own entree.  · If you get your own entree, eat only half. Ask for a box at the beginning of your meal and put the rest of your entree in it so you are  not tempted to eat it.  · If calories are listed on the menu, choose the lower calorie options.  · Choose dishes that include vegetables, fruits, whole grains, low-fat dairy products, and lean protein.  · Choose items that are boiled, broiled, grilled, or steamed. Stay away from items that are buttered, battered, fried, or served with cream sauce. Items labeled “crispy” are usually fried, unless stated otherwise.  · Choose water, low-fat milk, unsweetened iced tea, or other drinks without added sugar. If you want an alcoholic beverage, choose a lower calorie option such as a glass of wine or light beer.  · Ask for dressings, sauces, and syrups on the side. These are usually high in calories, so you should limit the amount you eat.  · If you want a salad, choose a garden salad and ask for grilled meats. Avoid extra toppings like chan, cheese, or fried items. Ask for the dressing on the side, or ask for olive oil and vinegar or lemon to use as dressing.  · Estimate how many servings of a food you are given. For example, a serving of cooked rice is ½ cup or about the size of half a baseball. Knowing serving sizes will help you be aware of how much food you are eating at restaurants. The list below tells you how big or small some common portion sizes are based on everyday objects:  ? 1 oz--4 stacked dice.  ? 3 oz--1 deck of cards.  ? 1 tsp--1 die.  ? 1 Tbsp--½ a ping-pong ball.  ? 2 Tbsp--1 ping-pong ball.  ? ½ cup--½ baseball.  ? 1 cup--1 baseball.  Summary  · Calorie counting means keeping track of how many calories you eat and drink each day. If you eat fewer calories than your body needs, you should lose weight.  · A healthy amount of weight to lose per week is usually 1-2 lb (0.5-0.9 kg). This usually means reducing your daily calorie intake by 500-750 calories.  · The number of calories in a food can be found on a Nutrition Facts label. If a food does not have a Nutrition Facts label, try to look up the calories  online or ask your dietitian for help.  · Use your calories on foods and drinks that will fill you up, and not on foods and drinks that will leave you hungry.  · Use smaller plates, glasses, and bowls to prevent overeating.  This information is not intended to replace advice given to you by your health care provider. Make sure you discuss any questions you have with your health care provider.  Document Released: 12/18/2006 Document Revised: 11/17/2017 Document Reviewed: 11/17/2017  [employer] Interactive Patient Education © 2018 [employer]

## 2018-12-18 NOTE — PROGRESS NOTES
Subjective    Ms. Ramsay is 73 y.o. female    Chief Complaint: UTI    History of Present Illness     UTI  Patient is here for recurrent UTI.  The infections have been occurring for several years.  Context of the infections recurrent.  Type of UTI is Bacteriuria without definite localization Patient has had 3 infections in the last year.  Of the diagnosed infections, 2 have been culture proven.  Onset has been sudden. Course of the symptoms has been stable .  Associated symptoms include dysuria and difficulty urinating.  The patient has tried  prophylactic antibiotics  not very effective for management.   Previous  urologic procedures hysterectomy and cystocele.  Previous workup includes cystoscopy remote history.      The following portions of the patient's history were reviewed and updated as appropriate: allergies, current medications, past family history, past medical history, past social history, past surgical history and problem list.    Review of Systems   Constitutional: Negative for appetite change, chills, fatigue, fever and unexpected weight change.   HENT: Negative for congestion, dental problem, ear pain, hearing loss, nosebleeds, sinus pressure and trouble swallowing.    Eyes: Negative for pain, discharge, redness and itching.   Respiratory: Negative for apnea, cough, choking and shortness of breath.    Cardiovascular: Negative for chest pain and palpitations.   Gastrointestinal: Negative for abdominal distention, abdominal pain, blood in stool, constipation, diarrhea, nausea and vomiting.   Endocrine: Negative for cold intolerance and heat intolerance.   Genitourinary: Positive for difficulty urinating, frequency and urgency. Negative for decreased urine volume, dyspareunia, dysuria, enuresis, flank pain, genital sores, hematuria, menstrual problem, pelvic pain, vaginal bleeding, vaginal discharge and vaginal pain.   Musculoskeletal: Negative for arthralgias, back pain and gait problem.   Skin: Negative  for pallor, rash and wound.   Allergic/Immunologic: Negative for immunocompromised state.   Neurological: Negative for dizziness, tremors, seizures, weakness, numbness and headaches.   Hematological: Negative for adenopathy. Does not bruise/bleed easily.   Psychiatric/Behavioral: Negative for agitation, behavioral problems, hallucinations, self-injury and suicidal ideas.         Current Outpatient Medications:   •  aspirin 81 MG EC tablet, Take 81 mg by mouth Daily., Disp: , Rfl:   •  Biotin 1 MG capsule, Take 1 capsule by mouth Daily., Disp: , Rfl:   •  calcium carbonate-cholecalciferol (CALCIUM 500 +D) 500-400 MG-UNIT tablet tablet, Take 2 tablets by mouth Daily. Taking 1200 mg w/D, 1000 IU, Disp: , Rfl:   •  Cholecalciferol (VITAMIN D3) 1000 units capsule, Take 1,000 Units by mouth Daily., Disp: , Rfl:   •  clonazePAM (KlonoPIN) 0.5 MG tablet, Take 0.5 mg by mouth 3 (Three) Times a Day As Needed for Anxiety., Disp: , Rfl:   •  coenzyme Q10 100 MG capsule, Take 100 mg by mouth 2 (Two) Times a Day., Disp: , Rfl:   •  diclofenac sodium (VOTAREN XR) 100 MG 24 hr tablet, Take 50 mg by mouth Daily., Disp: , Rfl:   •  montelukast (SINGULAIR) 10 MG tablet, Take 10 mg by mouth every night at bedtime., Disp: , Rfl:   •  Multiple Vitamins-Minerals (HAIR SKIN AND NAILS FORMULA PO), Take 1 capsule by mouth Daily., Disp: , Rfl:   •  nebivolol (BYSTOLIC) 2.5 MG tablet, Take 0.5 tablets by mouth Daily., Disp: 45 tablet, Rfl: 3  •  nitrofurantoin (MACRODANTIN) 100 MG capsule, Take 1 capsule by mouth 2 (Two) Times a Day., Disp: , Rfl:   •  NON FORMULARY, Take 1 tablet by mouth 2 (Two) Times a Day. Joint Advantage Gold , Disp: , Rfl:   •  Omega-3 Fatty Acids (FISH OIL) 1200 MG capsule delayed-release, Take 2,400 mg by mouth 2 (Two) Times a Day., Disp: , Rfl:   •  omeprazole (priLOSEC) 40 MG capsule, Take 40 mg by mouth Daily., Disp: , Rfl:   •  polyethylene glycol (MIRALAX) powder, Take 17 g by mouth Daily As Needed  (constipation)., Disp: , Rfl:   •  Probiotic Product (PROBIOTIC FORMULA PO), Take 1 capsule by mouth Daily., Disp: , Rfl:   •  raNITIdine (ZANTAC) 150 MG tablet, Take 300 mg by mouth Every Night., Disp: , Rfl:   •  simvastatin (ZOCOR) 20 MG tablet, Take 20 mg by mouth Every Night., Disp: , Rfl:   •  triamcinolone (KENALOG) 0.1 % ointment, Apply 1 application topically to the appropriate area as directed 2 (Two) Times a Day As Needed., Disp: , Rfl:   •  vitamin B-6 (PYRIDOXINE) 100 MG tablet, Take 100 mg by mouth Daily., Disp: , Rfl:   •  Zinc 50 MG tablet, Take 50 mg by mouth Daily., Disp: , Rfl:     Past Medical History:   Diagnosis Date   • Acid reflux    • Arrhythmia    • Cardiac arrhythmia    • Cataract    • Coronary artery disease 2018    abnormal nuclear stress (inferior and septal hypokinesis)   • Dizziness and giddiness    • Hypercholesterolemia    • Hypertension    • Nonsustained ventricular tachycardia (CMS/HCC)    • Supraventricular tachycardia (CMS/HCC)        Past Surgical History:   Procedure Laterality Date   • BUNIONECTOMY     • CATARACT EXTRACTION, BILATERAL     • FINGER/THUMB ARTHROPLASTY      thumb surgery   • HYSTERECTOMY     • REPLACEMENT TOTAL KNEE Right        Social History     Socioeconomic History   • Marital status:      Spouse name: Not on file   • Number of children: Not on file   • Years of education: Not on file   • Highest education level: Not on file   Tobacco Use   • Smoking status: Former Smoker     Packs/day: 1.00     Years: 12.00     Pack years: 12.00     Types: Cigarettes     Start date:      Last attempt to quit: 1978     Years since quittin.5   • Smokeless tobacco: Never Used   • Tobacco comment: exposed to 2nd hand smoke at work   Substance and Sexual Activity   • Alcohol use: Yes     Alcohol/week: 1.2 oz     Types: 2 Cans of beer per week     Comment: OCC    • Drug use: No   • Sexual activity: Not Currently     Partners: Male       Family History  "  Problem Relation Age of Onset   • Kidney disease Mother    • COPD Brother    • Lung cancer Maternal Aunt    • Dementia Maternal Grandmother    • Allergic rhinitis Brother    • Osteoarthritis Brother      Patient's Body mass index is 31.64 kg/m². BMI is above normal parameters. Recommendations include: educational material.      Objective    Temp 98 °F (36.7 °C)   Ht 170.2 cm (67\")   Wt 91.6 kg (202 lb)   BMI 31.64 kg/m²     Physical Exam   Constitutional: She is oriented to person, place, and time. She appears well-developed and well-nourished. No distress.   Neck: Normal range of motion. Neck supple. No tracheal deviation present. No thyromegaly (no mass) present.   Cardiovascular: Intact distal pulses.   No significant edema or varicosities. No tenderness   Pulmonary/Chest: Effort normal. No respiratory distress. She exhibits no tenderness.   No intercostal retractions, use of accessory muscles or asymmetric movements.    Abdominal: Soft. Normal appearance. She exhibits no distension, no ascites and no mass. There is no hepatosplenomegaly. There is no tenderness. There is no rigidity, no rebound, no guarding and no CVA tenderness. No hernia.   Stool specimen is not indicated    Genitourinary: Vagina normal. Pelvic exam was performed with patient supine. There is no rash, tenderness or lesion on the right labia. There is no rash, tenderness or lesion on the left labia. Uterus is not enlarged and not tender. Cervix exhibits no motion tenderness, no discharge and no friability. Right adnexum displays no mass, no tenderness and no fullness. Left adnexum displays no mass, no tenderness and no fullness. No tenderness in the vagina. No vaginal discharge found.   Genitourinary Comments: Normal hair distribution. The urethra is without mass and normal in appearance. The bladder is palpably without mass or tenderness. Vaginal epithelium is without mass; + atrophy. No significant prolapse. The anus and perineum are " normal.    Lymphadenopathy:     She has no cervical adenopathy.        Right: No inguinal adenopathy present.        Left: No inguinal adenopathy present.   Neurological: She is alert and oriented to person, place, and time.   Skin: No ecchymosis and no rash noted. She is not diaphoretic. No cyanosis or erythema. Nails show no clubbing.   Psychiatric: She has a normal mood and affect. Her behavior is normal.   Vitals reviewed.      Procedure note for in and out catheterization  She is placed in the dorsal lithotomy position.  She is carefully prepped with Betadine around the urethra.  A pediatric catheterization kit is used which contains an approximate 5 [place] catheter.this is introduced into the urinary bladder.  Approximately 30 mL of urine is obtained and sent to the lab for culture and sensitivity.    Results for orders placed or performed in visit on 12/19/18   POC Urinalysis Dipstick, Multipro   Result Value Ref Range    Color Yellow Yellow, Straw, Dark Yellow, Stephanie    Clarity, UA Clear Clear    Glucose, UA Negative Negative, 1000 mg/dL (3+) mg/dL    Bilirubin Negative Negative    Ketones, UA Negative Negative    Specific Gravity  1.005 1.005 - 1.030    Blood, UA Negative Negative    pH, Urine 6.0 5.0 - 8.0    Protein, POC Negative Negative mg/dL    Urobilinogen, UA Normal Normal    Nitrite, UA Negative Negative    Leukocytes Trace (A) Negative     Assessment and Plan    Diagnoses and all orders for this visit:    Urinary tract infection without hematuria, site unspecified  -     POC Urinalysis Dipstick, Multipro    Atrophic vaginitis    Chronic cystitis  -     Urine Culture - Urine, Urine, Catheter In/Out  -     US Renal Bilateral; Future  -     XR Abdomen KUB; Future      Patient had an allergy to Premarin cream per her report.  I reviewed 49 pages of medical records indicating she has had a remote cystoscopy in the past.  She has had infections enterococcus as well as Escherichia coli.  I am going to  evaluate her with cystoscopy as well as renal ultrasound and KUB.  We will then decide on a course of treatment from there.

## 2018-12-19 ENCOUNTER — OFFICE VISIT (OUTPATIENT)
Dept: UROLOGY | Facility: CLINIC | Age: 73
End: 2018-12-19

## 2018-12-19 VITALS — HEIGHT: 67 IN | TEMPERATURE: 98 F | WEIGHT: 202 LBS | BODY MASS INDEX: 31.71 KG/M2

## 2018-12-19 DIAGNOSIS — N30.20 CHRONIC CYSTITIS: ICD-10-CM

## 2018-12-19 DIAGNOSIS — N95.2 ATROPHIC VAGINITIS: ICD-10-CM

## 2018-12-19 DIAGNOSIS — N39.0 URINARY TRACT INFECTION WITHOUT HEMATURIA, SITE UNSPECIFIED: Primary | ICD-10-CM

## 2018-12-19 LAB
BILIRUB BLD-MCNC: NEGATIVE MG/DL
CLARITY, POC: CLEAR
COLOR UR: YELLOW
GLUCOSE UR STRIP-MCNC: NEGATIVE MG/DL
KETONES UR QL: NEGATIVE
LEUKOCYTE EST, POC: ABNORMAL
NITRITE UR-MCNC: NEGATIVE MG/ML
PH UR: 6 [PH] (ref 5–8)
PROT UR STRIP-MCNC: NEGATIVE MG/DL
RBC # UR STRIP: NEGATIVE /UL
SP GR UR: 1 (ref 1–1.03)
UROBILINOGEN UR QL: NORMAL

## 2018-12-19 PROCEDURE — 87086 URINE CULTURE/COLONY COUNT: CPT | Performed by: UROLOGY

## 2018-12-19 PROCEDURE — 51701 INSERT BLADDER CATHETER: CPT | Performed by: UROLOGY

## 2018-12-19 PROCEDURE — 99204 OFFICE O/P NEW MOD 45 MIN: CPT | Performed by: UROLOGY

## 2018-12-19 PROCEDURE — 51798 US URINE CAPACITY MEASURE: CPT | Performed by: UROLOGY

## 2018-12-19 PROCEDURE — 81001 URINALYSIS AUTO W/SCOPE: CPT | Performed by: UROLOGY

## 2018-12-19 NOTE — PATIENT INSTRUCTIONS

## 2018-12-21 LAB — BACTERIA SPEC AEROBE CULT: NORMAL

## 2019-01-04 ENCOUNTER — OFFICE VISIT (OUTPATIENT)
Dept: OTOLARYNGOLOGY | Facility: CLINIC | Age: 74
End: 2019-01-04

## 2019-01-04 VITALS
BODY MASS INDEX: 32.96 KG/M2 | SYSTOLIC BLOOD PRESSURE: 128 MMHG | DIASTOLIC BLOOD PRESSURE: 70 MMHG | TEMPERATURE: 97.5 F | WEIGHT: 210 LBS | HEIGHT: 67 IN

## 2019-01-04 DIAGNOSIS — R43.2 ABNORMAL SENSE OF TASTE: ICD-10-CM

## 2019-01-04 DIAGNOSIS — R43.0 LOSS OF SENSE OF SMELL: Primary | ICD-10-CM

## 2019-01-04 PROCEDURE — 31231 NASAL ENDOSCOPY DX: CPT | Performed by: PHYSICIAN ASSISTANT

## 2019-01-04 PROCEDURE — 99214 OFFICE O/P EST MOD 30 MIN: CPT | Performed by: PHYSICIAN ASSISTANT

## 2019-01-04 RX ORDER — FLUTICASONE PROPIONATE 50 MCG
2 SPRAY, SUSPENSION (ML) NASAL DAILY
Qty: 16 G | Refills: 11 | Status: SHIPPED | OUTPATIENT
Start: 2019-01-04 | End: 2021-07-30

## 2019-01-04 NOTE — PATIENT INSTRUCTIONS
Use Flonase everyday as directed. Take smell test day before follow-up. Follow-up in six weeks.      MyPlate from [place]  The general, healthful diet is based on the 2010 Dietary Guidelines for Americans. The amount of food you need to eat from each food group depends on your age, sex, and level of physical activity and can be individualized by a dietitian. Go to ChooseMyPlate.gov for more information.  What do I need to know about the MyPlate plan?  · Enjoy your food, but eat less.  · Avoid oversized portions.  ? ½ of your plate should include fruits and vegetables.  ? ¼ of your plate should be grains.  ? ¼ of your plate should be protein.  Grains  · Make at least half of your grains whole grains.  · For a 2,000 calorie daily food plan, eat 6 oz every day.  · 1 oz is about 1 slice bread, 1 cup cereal, or ½ cup cooked rice, cereal, or pasta.  Vegetables  · Make half your plate fruits and vegetables.  · For a 2,000 calorie daily food plan, eat 2½ cups every day.  · 1 cup is about 1 cup raw or cooked vegetables or vegetable juice or 2 cups raw leafy greens.  Fruits  · Make half your plate fruits and vegetables.  · For a 2,000 calorie daily food plan, eat 2 cups every day.  · 1 cup is about 1 cup fruit or 100% fruit juice or ½ cup dried fruit.  Protein  · For a 2,000 calorie daily food plan, eat 5½ oz every day.  · 1 oz is about 1 oz meat, poultry, or fish, ¼ cup cooked beans, 1 egg, 1 Tbsp peanut butter, or ½ oz nuts or seeds.  Dairy  · Switch to fat-free or low-fat (1%) milk.  · For a 2,000 calorie daily food plan, eat 3 cups every day.  · 1 cup is about 1 cup milk or yogurt or soy milk (soy beverage), 1½ oz natural cheese, or 2 oz processed cheese.  Fats, Oils, and Empty Calories  · Only small amounts of oils are recommended.  · Empty calories are calories from solid fats or added sugars.  · Compare sodium in foods like soup, bread, and frozen meals. Choose the foods with lower numbers.  · Drink water instead of  sugary drinks.  What foods can I eat?  Grains  Whole grains such as whole wheat, quinoa, millet, and bulgur. Bread, rolls, and pasta made from whole grains. Brown or wild rice. Hot or cold cereals made from whole grains and without added sugar.  Vegetables  All fresh vegetables, especially fresh red, dark green, or orange vegetables. Peas and beans. Low-sodium frozen or canned vegetables prepared without added salt. Low-sodium vegetable juices.  Fruits  All fresh, frozen, and dried fruits. Canned fruit packed in water or fruit juice without added sugar. Fruit juices without added sugar.  Meats and Other Protein Sources  Boiled, baked, or grilled lean meat trimmed of fat. Skinless poultry. Fresh seafood and shellfish. Canned seafood packed in water. Unsalted nuts and unsalted nut butters. Tofu. Dried beans and pea. Eggs.  Dairy  Low-fat or fat-free milk, yogurt, and cheeses.  Sweets and Desserts  Frozen desserts made from low-fat milk.  Fats and Oils  Olive, peanut, and canola oils and margarine. Salad dressing and mayonnaise made from these oils.  Other  Soups and casseroles made from allowed ingredients and without added fat or salt.  The items listed above may not be a complete list of recommended foods or beverages. Contact your dietitian for more options.  What foods are not recommended?  Grains  Sweetened, low-fiber cereals. Packaged baked goods. Snack crackers and chips. Cheese crackers, butter crackers, and biscuits. Frozen waffles, sweet breads, doughnuts, pastries, packaged baking mixes, pancakes, cakes, and cookies.  Vegetables  Regular canned or frozen vegetables or vegetables prepared with salt. Canned tomatoes. Canned tomato sauce. Fried vegetables. Vegetables in cream sauce or cheese sauce.  Fruits  Fruits packed in syrup or made with added sugar.  Meats and Other Protein Sources  Marbled or fatty meats such as ribs. Poultry with skin. Fried meats, poultry, eggs, or fish. Sausages, hot dogs, and deli  meats such as pastrami, bologna, or salami.  Dairy  Whole milk, cream, cheeses made from whole milk, sour cream. Ice cream or yogurt made from whole milk or with added sugar.  Beverages  For adults, no more than one alcoholic drink per day. Regular soft drinks or other sugary beverages. Juice drinks.  Sweets and Desserts  Sugary or fatty desserts, candy, and other sweets.  Fats and Oils  Solid shortening or partially hydrogenated oils. Solid margarine. Margarine that contains trans fats. Butter.  The items listed above may not be a complete list of foods and beverages to avoid. Contact your dietitian for more information.  This information is not intended to replace advice given to you by your health care provider. Make sure you discuss any questions you have with your health care provider.  Document Released: 01/06/2009 Document Revised: 05/25/2017 Document Reviewed: 11/26/2014  [employer] Patient Education © 2018 [employer]     Calorie Counting for Weight Loss  Calories are units of energy. Your body needs a certain amount of calories from food to keep you going throughout the day. When you eat more calories than your body needs, your body stores the extra calories as fat. When you eat fewer calories than your body needs, your body burns fat to get the energy it needs.  Calorie counting means keeping track of how many calories you eat and drink each day. Calorie counting can be helpful if you need to lose weight. If you make sure to eat fewer calories than your body needs, you should lose weight. Ask your health care provider what a healthy weight is for you.  For calorie counting to work, you will need to eat the right number of calories in a day in order to lose a healthy amount of weight per week. A dietitian can help you determine how many calories you need in a day and will give you suggestions on how to reach your calorie goal.  · A healthy amount of weight to lose per week is usually 1-2 lb  (0.5-0.9 kg). This usually means that your daily calorie intake should be reduced by 500-750 calories.  · Eating 1,200 - 1,500 calories per day can help most women lose weight.  · Eating 1,500 - 1,800 calories per day can help most men lose weight.    What do I need to know about calorie counting?  In order to meet your daily calorie goal, you will need to:  · Find out how many calories are in each food you would like to eat. Try to do this before you eat.  · Decide how much of the food you plan to eat.  · Write down what you ate and how many calories it had. Doing this is called keeping a food log.    To successfully lose weight, it is important to balance calorie counting with a healthy lifestyle that includes regular activity. Aim for 150 minutes of moderate exercise (such as walking) or 75 minutes of vigorous exercise (such as running) each week.  Where do I find calorie information?    The number of calories in a food can be found on a Nutrition Facts label. If a food does not have a Nutrition Facts label, try to look up the calories online or ask your dietitian for help.  Remember that calories are listed per serving. If you choose to have more than one serving of a food, you will have to multiply the calories per serving by the amount of servings you plan to eat. For example, the label on a package of bread might say that a serving size is 1 slice and that there are 90 calories in a serving. If you eat 1 slice, you will have eaten 90 calories. If you eat 2 slices, you will have eaten 180 calories.  How do I keep a food log?  Immediately after each meal, record the following information in your food log:  · What you ate. Don't forget to include toppings, sauces, and other extras on the food.  · How much you ate. This can be measured in cups, ounces, or number of items.  · How many calories each food and drink had.  · The total number of calories in the meal.    Keep your food log near you, such as in a small  "notebook in your pocket, or use a mobile leslee or website. Some programs will calculate calories for you and show you how many calories you have left for the day to meet your goal.  What are some calorie counting tips?  · Use your calories on foods and drinks that will fill you up and not leave you hungry:  ? Some examples of foods that fill you up are nuts and nut butters, vegetables, lean proteins, and high-fiber foods like whole grains. High-fiber foods are foods with more than 5 g fiber per serving.  ? Drinks such as sodas, specialty coffee drinks, alcohol, and juices have a lot of calories, yet do not fill you up.  · Eat nutritious foods and avoid empty calories. Empty calories are calories you get from foods or beverages that do not have many vitamins or protein, such as candy, sweets, and soda. It is better to have a nutritious high-calorie food (such as an avocado) than a food with few nutrients (such as a bag of chips).  · Know how many calories are in the foods you eat most often. This will help you calculate calorie counts faster.  · Pay attention to calories in drinks. Low-calorie drinks include water and unsweetened drinks.  · Pay attention to nutrition labels for \"low fat\" or \"fat free\" foods. These foods sometimes have the same amount of calories or more calories than the full fat versions. They also often have added sugar, starch, or salt, to make up for flavor that was removed with the fat.  · Find a way of tracking calories that works for you. Get creative. Try different apps or programs if writing down calories does not work for you.  What are some portion control tips?  · Know how many calories are in a serving. This will help you know how many servings of a certain food you can have.  · Use a measuring cup to measure serving sizes. You could also try weighing out portions on a kitchen scale. With time, you will be able to estimate serving sizes for some foods.  · Take some time to put servings of " different foods on your favorite plates, bowls, and cups so you know what a serving looks like.  · Try not to eat straight from a bag or box. Doing this can lead to overeating. Put the amount you would like to eat in a cup or on a plate to make sure you are eating the right portion.  · Use smaller plates, glasses, and bowls to prevent overeating.  · Try not to multitask (for example, watch TV or use your computer) while eating. If it is time to eat, sit down at a table and enjoy your food. This will help you to know when you are full. It will also help you to be aware of what you are eating and how much you are eating.  What are tips for following this plan?  Reading food labels  · Check the calorie count compared to the serving size. The serving size may be smaller than what you are used to eating.  · Check the source of the calories. Make sure the food you are eating is high in vitamins and protein and low in saturated and trans fats.  Shopping  · Read nutrition labels while you shop. This will help you make healthy decisions before you decide to purchase your food.  · Make a grocery list and stick to it.  Cooking  · Try to cook your favorite foods in a healthier way. For example, try baking instead of frying.  · Use low-fat dairy products.  Meal planning  · Use more fruits and vegetables. Half of your plate should be fruits and vegetables.  · Include lean proteins like poultry and fish.  How do I count calories when eating out?  · Ask for smaller portion sizes.  · Consider sharing an entree and sides instead of getting your own entree.  · If you get your own entree, eat only half. Ask for a box at the beginning of your meal and put the rest of your entree in it so you are not tempted to eat it.  · If calories are listed on the menu, choose the lower calorie options.  · Choose dishes that include vegetables, fruits, whole grains, low-fat dairy products, and lean protein.  · Choose items that are boiled, broiled,  grilled, or steamed. Stay away from items that are buttered, battered, fried, or served with cream sauce. Items labeled “crispy” are usually fried, unless stated otherwise.  · Choose water, low-fat milk, unsweetened iced tea, or other drinks without added sugar. If you want an alcoholic beverage, choose a lower calorie option such as a glass of wine or light beer.  · Ask for dressings, sauces, and syrups on the side. These are usually high in calories, so you should limit the amount you eat.  · If you want a salad, choose a garden salad and ask for grilled meats. Avoid extra toppings like chan, cheese, or fried items. Ask for the dressing on the side, or ask for olive oil and vinegar or lemon to use as dressing.  · Estimate how many servings of a food you are given. For example, a serving of cooked rice is ½ cup or about the size of half a baseball. Knowing serving sizes will help you be aware of how much food you are eating at restaurants. The list below tells you how big or small some common portion sizes are based on everyday objects:  ? 1 oz--4 stacked dice.  ? 3 oz--1 deck of cards.  ? 1 tsp--1 die.  ? 1 Tbsp--½ a ping-pong ball.  ? 2 Tbsp--1 ping-pong ball.  ? ½ cup--½ baseball.  ? 1 cup--1 baseball.  Summary  · Calorie counting means keeping track of how many calories you eat and drink each day. If you eat fewer calories than your body needs, you should lose weight.  · A healthy amount of weight to lose per week is usually 1-2 lb (0.5-0.9 kg). This usually means reducing your daily calorie intake by 500-750 calories.  · The number of calories in a food can be found on a Nutrition Facts label. If a food does not have a Nutrition Facts label, try to look up the calories online or ask your dietitian for help.  · Use your calories on foods and drinks that will fill you up, and not on foods and drinks that will leave you hungry.  · Use smaller plates, glasses, and bowls to prevent overeating.  This information is  not intended to replace advice given to you by your health care provider. Make sure you discuss any questions you have with your health care provider.  Document Released: 12/18/2006 Document Revised: 11/17/2017 Document Reviewed: 11/17/2017  [employer] Interactive Patient Education © 2018 [employer]     Exercising to Lose Weight  Exercising can help you to lose weight. In order to lose weight through exercise, you need to do vigorous-intensity exercise. You can tell that you are exercising with vigorous intensity if you are breathing very hard and fast and cannot hold a conversation while exercising.  Moderate-intensity exercise helps to maintain your current weight. You can tell that you are exercising at a moderate level if you have a higher heart rate and faster breathing, but you are still able to hold a conversation.  How often should I exercise?  Choose an activity that you enjoy and set realistic goals. Your health care provider can help you to make an activity plan that works for you. Exercise regularly as directed by your health care provider. This may include:  · Doing resistance training twice each week, such as:  ? Push-ups.  ? Sit-ups.  ? Lifting weights.  ? Using resistance bands.  · Doing a given intensity of exercise for a given amount of time. Choose from these options:  ? 150 minutes of moderate-intensity exercise every week.  ? 75 minutes of vigorous-intensity exercise every week.  ? A mix of moderate-intensity and vigorous-intensity exercise every week.    Children, pregnant women, people who are out of shape, people who are overweight, and older adults may need to consult a health care provider for individual recommendations. If you have any sort of medical condition, be sure to consult your health care provider before starting a new exercise program.  What are some activities that can help me to lose weight?  · Walking at a rate of at least 4.5 miles an hour.  · Jogging or running at a rate of  5 miles per hour.  · Biking at a rate of at least 10 miles per hour.  · Lap swimming.  · Roller-skating or in-line skating.  · Cross-country skiing.  · Vigorous competitive sports, such as football, basketball, and soccer.  · Jumping rope.  · Aerobic dancing.  How can I be more active in my day-to-day activities?  · Use the stairs instead of the elevator.  · Take a walk during your lunch break.  · If you drive, park your car farther away from work or school.  · If you take public transportation, get off one stop early and walk the rest of the way.  · Make all of your phone calls while standing up and walking around.  · Get up, stretch, and walk around every 30 minutes throughout the day.  What guidelines should I follow while exercising?  · Do not exercise so much that you hurt yourself, feel dizzy, or get very short of breath.  · Consult your health care provider prior to starting a new exercise program.  · Wear comfortable clothes and shoes with good support.  · Drink plenty of water while you exercise to prevent dehydration or heat stroke. Body water is lost during exercise and must be replaced.  · Work out until you breathe faster and your heart beats faster.  This information is not intended to replace advice given to you by your health care provider. Make sure you discuss any questions you have with your health care provider.  Document Released: 01/20/2012 Document Revised: 05/25/2017 Document Reviewed: 05/21/2015  [employer] Patient Education © 2018 [employer]

## 2019-01-04 NOTE — PROGRESS NOTES
CHANTEL Og     Chief Complaint   Patient presents with   • Can't smell and taste       HPI   Ruth Ramsay is a  73 y.o. female who complains of hyposmia. The symptoms are not localized to a particular location. The patient has had moderate to severe symptoms. The symptoms have been relatively constant for the last year. The symptoms are aggravated by  no identifiable factors. The symptoms are improved by no identifiable factors.    Review of Systems   Constitutional: Negative for activity change, appetite change, chills, diaphoresis, fatigue, fever and unexpected weight change.   HENT: Negative for congestion, dental problem, drooling, ear discharge, ear pain, facial swelling, hearing loss, mouth sores, nosebleeds, postnasal drip, rhinorrhea, sinus pressure, sneezing, sore throat, tinnitus, trouble swallowing and voice change.         Loss of sense of smell and taste   Eyes: Negative.    Respiratory: Negative.    Cardiovascular: Negative.    Gastrointestinal: Negative.    Endocrine: Negative.    Skin: Negative.    Allergic/Immunologic: Negative for environmental allergies, food allergies and immunocompromised state.   Neurological: Negative.    Hematological: Negative.    Psychiatric/Behavioral: Negative.    :    Past History:  Past Medical History:   Diagnosis Date   • Acid reflux    • Arrhythmia    • Cardiac arrhythmia    • Cataract    • Coronary artery disease 06/2018    abnormal nuclear stress (inferior and septal hypokinesis)   • Dizziness and giddiness    • Hypercholesterolemia    • Hypertension    • Nonsustained ventricular tachycardia (CMS/HCC)    • Supraventricular tachycardia (CMS/HCC)      Past Surgical History:   Procedure Laterality Date   • BUNIONECTOMY     • CATARACT EXTRACTION, BILATERAL     • FINGER/THUMB ARTHROPLASTY      thumb surgery   • HYSTERECTOMY     • REPLACEMENT TOTAL KNEE Right      Family History   Problem Relation Age of Onset   • Kidney disease Mother    • COPD Brother     • Lung cancer Maternal Aunt    • Dementia Maternal Grandmother    • Allergic rhinitis Brother    • Osteoarthritis Brother      Social History     Tobacco Use   • Smoking status: Former Smoker     Packs/day: 1.00     Years: 12.00     Pack years: 12.00     Types: Cigarettes     Start date:      Last attempt to quit: 1978     Years since quittin.6   • Smokeless tobacco: Never Used   • Tobacco comment: exposed to 2nd hand smoke at work   Substance Use Topics   • Alcohol use: Yes     Alcohol/week: 1.2 oz     Types: 2 Cans of beer per week     Comment: OCC    • Drug use: No     Outpatient Medications Marked as Taking for the 19 encounter (Office Visit) with Mauro Barraza PA   Medication Sig Dispense Refill   • aspirin 81 MG EC tablet Take 81 mg by mouth Daily.     • Biotin 1 MG capsule Take 1 capsule by mouth Daily.     • calcium carbonate-cholecalciferol (CALCIUM 500 +D) 500-400 MG-UNIT tablet tablet Take 2 tablets by mouth Daily. Taking 1200 mg w/D, 1000 IU     • Cholecalciferol (VITAMIN D3) 1000 units capsule Take 1,000 Units by mouth Daily.     • clonazePAM (KlonoPIN) 0.5 MG tablet Take 0.5 mg by mouth 3 (Three) Times a Day As Needed for Anxiety.     • coenzyme Q10 100 MG capsule Take 100 mg by mouth 2 (Two) Times a Day.     • diclofenac sodium (VOTAREN XR) 100 MG 24 hr tablet Take 50 mg by mouth Daily.     • montelukast (SINGULAIR) 10 MG tablet Take 10 mg by mouth every night at bedtime.     • Multiple Vitamins-Minerals (HAIR SKIN AND NAILS FORMULA PO) Take 1 capsule by mouth Daily.     • nebivolol (BYSTOLIC) 2.5 MG tablet Take 0.5 tablets by mouth Daily. 45 tablet 3   • nitrofurantoin (MACRODANTIN) 100 MG capsule Take 1 capsule by mouth 2 (Two) Times a Day.     • NON FORMULARY Take 1 tablet by mouth 2 (Two) Times a Day. Joint Advantage Gold      • Omega-3 Fatty Acids (FISH OIL) 1200 MG capsule delayed-release Take 2,400 mg by mouth 2 (Two) Times a Day.     • omeprazole (priLOSEC) 40 MG  capsule Take 40 mg by mouth Daily.     • polyethylene glycol (MIRALAX) powder Take 17 g by mouth Daily As Needed (constipation).     • Probiotic Product (PROBIOTIC FORMULA PO) Take 1 capsule by mouth Daily.     • raNITIdine (ZANTAC) 150 MG tablet Take 300 mg by mouth Every Night.     • simvastatin (ZOCOR) 20 MG tablet Take 20 mg by mouth Every Night.     • triamcinolone (KENALOG) 0.1 % ointment Apply 1 application topically to the appropriate area as directed 2 (Two) Times a Day As Needed.     • vitamin B-6 (PYRIDOXINE) 100 MG tablet Take 100 mg by mouth Daily.     • Zinc 50 MG tablet Take 50 mg by mouth Daily.       Allergies:  Estradiol    Vital Signs:   Vitals:    01/04/19 1027   BP: 128/70   Temp: 97.5 °F (36.4 °C)       Physical Exam   CONSTITUTIONAL: well nourished, alert, oriented, in no acute distress     COMMUNICATION AND VOICE: able to communicate normally, normal voice quality    HEAD: normocephalic, no lesions, atraumatic, no tenderness, no masses     FACE: appearance normal, no lesions, no tenderness, no deformities, facial motion symmetric    SALIVARY GLANDS: parotid glands with no tenderness, no swelling, no masses, submandibular glands with normal size, nontender    EYES: ocular motility normal, eyelids normal, orbits normal, no proptosis, conjunctiva normal , pupils equal, round     EARS:  Hearing: response to conversational voice normal bilaterally   External Ears: auricles without lesions  Otoscopic: tympanic membrane appearance normal, no lesions, no perforation, normal mobility, no fluid    NOSE:  External Nose: structure normal, no tenderness on palpation, no nasal discharge, no lesions, no evidence of trauma, nostrils patent   Intranasal Exam: nasal mucosa normal, vestibule within normal limits, inferior turbinate normal, nasal septum midline     ORAL:  Lips: upper and lower lips without lesion   Teeth: dentition within normal limits for age   Gums: gingivae healthy   Oral Mucosa: oral mucosa  normal, no mucosal lesions   Floor of Mouth: Warthin’s duct patent, mucosa normal  Tongue: lingual mucosa normal without lesions, normal tongue mobility   Palate: soft and hard palates with normal mucosa and structure  Oropharynx: oropharyngeal mucosa normal    NECK: neck appearance normal, no mass,  noted without erythema or tenderness    THYROID: no overt thyromegaly, no tenderness, nodules or mass present on palpation, position midline     LYMPH NODES: no lymphadenopathy    CHEST/RESPIRATORY: respiratory effort normal, normal breath sounds     CARDIOVASCULAR: rate and rhythm normal, extremities without cyanosis or edema      NEUROLOGIC/PSYCHIATRIC: oriented to time, place and person, mood normal, affect appropriate, CN II-XII intact grossly            PROCEDURE NOTE      PROCEDURE:    Nasal Endoscopy  ANESTHESIA:  Topical Ponticaine and Afrin Spray   REASON FOR THE PROCEDURE:  Patient is here for an evaluation for nasal congestion, chronic sinus problems.  The patient consented to operative intervention after a full discussion of risks, benefits, and alternatives. No guarantees were made or implied.   DETAILS OF THE OPERATION:  The patient was seated in the exam room chair. Nasal endoscopy was performed with the 0 degree scope through the nares after applying nebulized ponticaine/ Afrin spray . A 0 degree endoscope was introduced into the nasal cavity and gently directed to the deeper nasal cavity examining the following structures.   FINDINGS:  Mucosal Surfaces:  The mucosal surfaces demonstrated edema and erythema without any polyps or polypoid change.  Nasal Septum:  The nasal septum was found to have normal mucosa without significant deviation.  Inferior Turbinates:  The inferior turbinates were found to have normal mucosa.   Middle Turbinates:  The middle turbinates were found to have normal mucosa.  Middle Meatus:  The middle meatus were found to have normal mucosa.   Sphenoethmoid Recess:  The  sphenoethmoidal recess was found to have normal mucosa.   Nasopharynx:  The nasopharynx was found to have no lesion or mass.       RESULTS REVIEW:    I have reviewed the patients old records in the chart.    Assessment   Ruth was seen today for can't smell and taste.    Diagnoses and all orders for this visit:    Loss of sense of smell  -     fluticasone (FLONASE) 50 MCG/ACT nasal spray; 2 sprays into the nostril(s) as directed by provider Daily.    Abnormal sense of taste  -     fluticasone (FLONASE) 50 MCG/ACT nasal spray; 2 sprays into the nostril(s) as directed by provider Daily.      * Surgery not found *  No orders of the defined types were placed in this encounter.    Plan    Use Flonase everyday as directed. Take smell test day before follow-up. Follow-up in six weeks.    Return in about 6 weeks (around 2/15/2019) for Recheck sense of smell/taste with Dr. Fuentes.    CHANTEL Og  01/04/19  1:43 PM

## 2019-01-10 ENCOUNTER — PROCEDURE VISIT (OUTPATIENT)
Dept: UROLOGY | Facility: CLINIC | Age: 74
End: 2019-01-10

## 2019-01-10 ENCOUNTER — HOSPITAL ENCOUNTER (OUTPATIENT)
Dept: ULTRASOUND IMAGING | Facility: HOSPITAL | Age: 74
Discharge: HOME | End: 2019-01-10
Attending: UROLOGY | Admitting: UROLOGY

## 2019-01-10 ENCOUNTER — HOSPITAL ENCOUNTER (OUTPATIENT)
Dept: GENERAL RADIOLOGY | Facility: HOSPITAL | Age: 74
Discharge: HOME | End: 2019-01-10
Attending: UROLOGY

## 2019-01-10 DIAGNOSIS — N30.20 CHRONIC CYSTITIS: ICD-10-CM

## 2019-01-10 DIAGNOSIS — N95.2 ATROPHIC VAGINITIS: ICD-10-CM

## 2019-01-10 DIAGNOSIS — N39.0 URINARY TRACT INFECTION WITHOUT HEMATURIA, SITE UNSPECIFIED: Primary | ICD-10-CM

## 2019-01-10 PROCEDURE — 74018 RADEX ABDOMEN 1 VIEW: CPT

## 2019-01-10 PROCEDURE — 99213 OFFICE O/P EST LOW 20 MIN: CPT | Performed by: UROLOGY

## 2019-01-10 PROCEDURE — 52000 CYSTOURETHROSCOPY: CPT | Performed by: UROLOGY

## 2019-01-10 PROCEDURE — 76775 US EXAM ABDO BACK WALL LIM: CPT

## 2019-01-10 RX ORDER — NITROFURANTOIN MACROCRYSTALS 50 MG/1
50 CAPSULE ORAL NIGHTLY
Qty: 30 CAPSULE | Refills: 6 | Status: SHIPPED | OUTPATIENT
Start: 2019-01-10 | End: 2021-07-30

## 2019-01-10 NOTE — PROGRESS NOTES
Subjective    Ms. Ramsay is 73 y.o. female    Chief Complaint: UTI    History of Present Illness     UTI  Patient is here for recurrent UTI.  The infections have been occurring for several years.  Context of the infections recurrent.  Type of UTI is Bacteriuria without definite localization Patient has had 3 infections in the last year.  Of the diagnosed infections, 2 have been culture proven.  Onset has been sudden. Course of the symptoms has been stable .  Associated symptoms include dysuria and difficulty urinating.  The patient has tried  prophylactic antibiotics  not very effective for management.   Previous  urologic procedures hysterectomy and cystocele.  Previous workup includes cystoscopy remote history.           The following portions of the patient's history were reviewed and updated as appropriate: allergies, current medications, past family history, past medical history, past social history, past surgical history and problem list.    Review of Systems   Constitutional: Negative for chills and fever.   Gastrointestinal: Negative for abdominal pain, anal bleeding and blood in stool.   Genitourinary: Positive for frequency, pelvic pain and urgency. Negative for dysuria and hematuria.         Current Outpatient Medications:   •  aspirin 81 MG EC tablet, Take 81 mg by mouth Daily., Disp: , Rfl:   •  Biotin 1 MG capsule, Take 1 capsule by mouth Daily., Disp: , Rfl:   •  calcium carbonate-cholecalciferol (CALCIUM 500 +D) 500-400 MG-UNIT tablet tablet, Take 2 tablets by mouth Daily. Taking 1200 mg w/D, 1000 IU, Disp: , Rfl:   •  Cholecalciferol (VITAMIN D3) 1000 units capsule, Take 1,000 Units by mouth Daily., Disp: , Rfl:   •  clonazePAM (KlonoPIN) 0.5 MG tablet, Take 0.5 mg by mouth 3 (Three) Times a Day As Needed for Anxiety., Disp: , Rfl:   •  coenzyme Q10 100 MG capsule, Take 100 mg by mouth 2 (Two) Times a Day., Disp: , Rfl:   •  diclofenac sodium (VOTAREN XR) 100 MG 24 hr tablet, Take 50 mg by mouth  Daily., Disp: , Rfl:   •  fluticasone (FLONASE) 50 MCG/ACT nasal spray, 2 sprays into the nostril(s) as directed by provider Daily., Disp: 16 g, Rfl: 11  •  montelukast (SINGULAIR) 10 MG tablet, Take 10 mg by mouth every night at bedtime., Disp: , Rfl:   •  Multiple Vitamins-Minerals (HAIR SKIN AND NAILS FORMULA PO), Take 1 capsule by mouth Daily., Disp: , Rfl:   •  nebivolol (BYSTOLIC) 2.5 MG tablet, Take 0.5 tablets by mouth Daily., Disp: 45 tablet, Rfl: 3  •  nitrofurantoin (MACRODANTIN) 100 MG capsule, Take 1 capsule by mouth 2 (Two) Times a Day., Disp: , Rfl:   •  NON FORMULARY, Take 1 tablet by mouth 2 (Two) Times a Day. Joint Advantage Gold , Disp: , Rfl:   •  Omega-3 Fatty Acids (FISH OIL) 1200 MG capsule delayed-release, Take 2,400 mg by mouth 2 (Two) Times a Day., Disp: , Rfl:   •  omeprazole (priLOSEC) 40 MG capsule, Take 40 mg by mouth Daily., Disp: , Rfl:   •  polyethylene glycol (MIRALAX) powder, Take 17 g by mouth Daily As Needed (constipation)., Disp: , Rfl:   •  Probiotic Product (PROBIOTIC FORMULA PO), Take 1 capsule by mouth Daily., Disp: , Rfl:   •  raNITIdine (ZANTAC) 150 MG tablet, Take 300 mg by mouth Every Night., Disp: , Rfl:   •  simvastatin (ZOCOR) 20 MG tablet, Take 20 mg by mouth Every Night., Disp: , Rfl:   •  triamcinolone (KENALOG) 0.1 % ointment, Apply 1 application topically to the appropriate area as directed 2 (Two) Times a Day As Needed., Disp: , Rfl:   •  vitamin B-6 (PYRIDOXINE) 100 MG tablet, Take 100 mg by mouth Daily., Disp: , Rfl:   •  Zinc 50 MG tablet, Take 50 mg by mouth Daily., Disp: , Rfl:     Past Medical History:   Diagnosis Date   • Acid reflux    • Arrhythmia    • Cardiac arrhythmia    • Cataract    • Coronary artery disease 06/2018    abnormal nuclear stress (inferior and septal hypokinesis)   • Dizziness and giddiness    • Hypercholesterolemia    • Hypertension    • Nonsustained ventricular tachycardia (CMS/HCC)    • Supraventricular tachycardia (CMS/HCC)         Past Surgical History:   Procedure Laterality Date   • BUNIONECTOMY     • CATARACT EXTRACTION, BILATERAL     • FINGER/THUMB ARTHROPLASTY      thumb surgery   • HYSTERECTOMY     • REPLACEMENT TOTAL KNEE Right        Social History     Socioeconomic History   • Marital status:      Spouse name: Not on file   • Number of children: Not on file   • Years of education: Not on file   • Highest education level: Not on file   Tobacco Use   • Smoking status: Former Smoker     Packs/day: 1.00     Years: 12.00     Pack years: 12.00     Types: Cigarettes     Start date:      Last attempt to quit: 1978     Years since quittin.6   • Smokeless tobacco: Never Used   • Tobacco comment: exposed to 2nd hand smoke at work   Substance and Sexual Activity   • Alcohol use: Yes     Alcohol/week: 1.2 oz     Types: 2 Cans of beer per week     Comment: OCC    • Drug use: No   • Sexual activity: Not Currently     Partners: Male       Family History   Problem Relation Age of Onset   • Kidney disease Mother    • COPD Brother    • Lung cancer Maternal Aunt    • Dementia Maternal Grandmother    • Allergic rhinitis Brother    • Osteoarthritis Brother        Objective    There were no vitals taken for this visit.    Physical Exam   Constitutional: She is oriented to person, place, and time. She appears well-developed and well-nourished. No distress.   Pulmonary/Chest: Effort normal.   Abdominal: Soft. She exhibits no distension and no mass. There is no tenderness. There is no rebound and no guarding. No hernia.   Neurological: She is alert and oriented to person, place, and time.   Skin: Skin is warm and dry. She is not diaphoretic.   Psychiatric: She has a normal mood and affect.   Vitals reviewed.      Pre- operative diagnosis:  Lower urinary tract symptoms    Post operative diagnosis:  Same    Procedure:  The patient was prepped and draped in a normal sterile fashion.  The urethra was anesthetized with 2% lidocaine jelly.   A rigid cystoscope was introduced per urethra.      Urethra:  Normal    Bladder:  There is no evidence of a stone, foreign body or mass within the bladder.  The bladder is minimally trabeculated.  The bladder neck is without contracture.    Ureteral orifices:  Normal position bilaterally and Clear efflux bilaterally    Patient tolerated the procedure well    Complications: none    Blood loss: minimal    Follow up:    Routine follow up    Renal ultrasound independent review    The renal ultrasound is available for me to review.  Treatment recommendations require an independent review.  This film has been reviewed by the radiologist to determine any non urologic abnormalities that are presents.  However, I very closely inspected the kidneys for size, symmetry, contour, parenchymal thickness, perinephric reaction, presence of calcifications, and intrarenal dilation of the collecting system.       The right kidney appears normal on this ultrasound.  The renal parenchymal is normal in thickness.  There are no solid masses or cysts.  There is no hydronephrosis.  There are no stones.      The left kidney appears normal on this ultrasound.  The renal parenchymal is normal in thickness.  There are no solid masses or cysts.  There is no hydronephrosis.  There are no stones.      The bladder appears normal on thisultrsaound.  The bladder appears normal in thickness.  There no masses or stones seen on this exam.       KUB independent review    A KUB is available for me to review today.  The image is inspected for a bowel gas pattern and the general bone structure of the spine and pelvis. The kidneys are then inspected closely.  Renal outline is noted if identifiable. The kidney, collecting system, and anticipated path of the ureter are examined for calcifications including those in the true pelvis.  This film reveals:    On the right there are no calcificaitons seen in the kidney or the expected course of the ureter. .    On  the left there are no calcificaitons seen in the kidney or the expected course of the ureter. .      Results for orders placed or performed in visit on 12/19/18   Urine Culture - Urine, Urine, Catheter In/Out   Result Value Ref Range    Urine Culture No growth at 2 days    POC Urinalysis Dipstick, Multipro   Result Value Ref Range    Color Yellow Yellow, Straw, Dark Yellow, Stephanie    Clarity, UA Clear Clear    Glucose, UA Negative Negative, 1000 mg/dL (3+) mg/dL    Bilirubin Negative Negative    Ketones, UA Negative Negative    Specific Gravity  1.005 1.005 - 1.030    Blood, UA Negative Negative    pH, Urine 6.0 5.0 - 8.0    Protein, POC Negative Negative mg/dL    Urobilinogen, UA Normal Normal    Nitrite, UA Negative Negative    Leukocytes Trace (A) Negative     Assessment and Plan    Diagnoses and all orders for this visit:    Urinary tract infection without hematuria, site unspecified  -     Cancel: POC Urinalysis Dipstick, Multipro        Cystoscopic examination as well as upper tract examination shows no  abnormalities.  I am going to give her prophylactic Macrodantin and she is intolerant to Premarin cream.  We will try this for 6 months.  It is not effective we can attempt trimethoprim prophylaxis.  Again I see no correctable  issue that I can address.    She will follow-up in 6 months with NP.

## 2019-02-13 NOTE — PROGRESS NOTES
YOB: 1945  Location: [facility]  Location Address: [address] [place] [address], [facility], [address] [place] [address]  Location Phone: [phone]    Chief Complaint   Patient presents with   • Follow-up     taste       History of Present Illness  Ruth Ramsay is a  73 y.o. female who presents for follow-up evaluation of complains of hyposmia. The symptoms are not localized to a particular location. The patient has had moderate to severe symptoms. The symptoms have been relatively constant for the last year. The symptoms are aggravated by  no identifiable factors. The symptoms are improved by no identifiable factors.    During her last visit she was started on Flonase and reports improved symptoms and has been able to eat peanut butter again. She reports continue inability to smell strong foul odors like skunks and bowl movement.    Smell test scored 27 right indicating moderate microsmia.    Past Medical History:   Diagnosis Date   • Acid reflux    • Arrhythmia    • Cardiac arrhythmia    • Cataract    • Coronary artery disease 2018    abnormal nuclear stress (inferior and septal hypokinesis)   • Dizziness and giddiness    • Hypercholesterolemia    • Hypertension    • Nonsustained ventricular tachycardia (CMS/HCC)    • Supraventricular tachycardia (CMS/HCC)        Past Surgical History:   Procedure Laterality Date   • BUNIONECTOMY     • CATARACT EXTRACTION, BILATERAL     • FINGER/THUMB ARTHROPLASTY      thumb surgery   • HYSTERECTOMY     • REPLACEMENT TOTAL KNEE Right        Outpatient Medications Marked as Taking for the 19 encounter (Office Visit) with Jamey Fuentes MD   Medication Sig Dispense Refill   • aspirin 81 MG EC tablet Take 81 mg by mouth Daily.     • Biotin 1 MG capsule Take 1 capsule by mouth Daily.     • calcium carbonate-cholecalciferol (CALCIUM 500 +D) 500-400 MG-UNIT tablet tablet Take 2 tablets by mouth Daily. Taking 1200 mg w/D, 1000 IU     • Cholecalciferol (VITAMIN D3) 1000  units capsule Take 1,000 Units by mouth Daily.     • clonazePAM (KlonoPIN) 0.5 MG tablet Take 0.5 mg by mouth 3 (Three) Times a Day As Needed for Anxiety.     • coenzyme Q10 100 MG capsule Take 100 mg by mouth 2 (Two) Times a Day.     • diclofenac sodium (VOTAREN XR) 100 MG 24 hr tablet Take 50 mg by mouth Daily.     • fluticasone (FLONASE) 50 MCG/ACT nasal spray 2 sprays into the nostril(s) as directed by provider Daily. 16 g 11   • montelukast (SINGULAIR) 10 MG tablet Take 10 mg by mouth every night at bedtime.     • Multiple Vitamins-Minerals (HAIR SKIN AND NAILS FORMULA PO) Take 1 capsule by mouth Daily.     • nebivolol (BYSTOLIC) 2.5 MG tablet Take 0.5 tablets by mouth Daily. 45 tablet 3   • nitrofurantoin (MACRODANTIN) 100 MG capsule Take 1 capsule by mouth 2 (Two) Times a Day.     • nitrofurantoin (MACRODANTIN) 50 MG capsule Take 1 capsule by mouth Every Night. 30 capsule 6   • NON FORMULARY Take 1 tablet by mouth 2 (Two) Times a Day. Joint Advantage Gold      • Omega-3 Fatty Acids (FISH OIL) 1200 MG capsule delayed-release Take 2,400 mg by mouth 2 (Two) Times a Day.     • omeprazole (priLOSEC) 40 MG capsule Take 40 mg by mouth Daily.     • polyethylene glycol (MIRALAX) powder Take 17 g by mouth Daily As Needed (constipation).     • Probiotic Product (PROBIOTIC FORMULA PO) Take 1 capsule by mouth Daily.     • raNITIdine (ZANTAC) 150 MG tablet Take 300 mg by mouth Every Night.     • simvastatin (ZOCOR) 20 MG tablet Take 20 mg by mouth Every Night.     • triamcinolone (KENALOG) 0.1 % ointment Apply 1 application topically to the appropriate area as directed 2 (Two) Times a Day As Needed.     • vitamin B-6 (PYRIDOXINE) 100 MG tablet Take 100 mg by mouth Daily.     • Zinc 50 MG tablet Take 50 mg by mouth Daily.         Estradiol    Family History   Problem Relation Age of Onset   • Kidney disease Mother    • COPD Brother    • Lung cancer Maternal Aunt    • Dementia Maternal Grandmother    • Allergic rhinitis  Brother    • Osteoarthritis Brother        Social History     Socioeconomic History   • Marital status:      Spouse name: Not on file   • Number of children: Not on file   • Years of education: Not on file   • Highest education level: Not on file   Social Needs   • Financial resource strain: Not on file   • Food insecurity - worry: Not on file   • Food insecurity - inability: Not on file   • Transportation needs - medical: Not on file   • Transportation needs - non-medical: Not on file   Occupational History   • Not on file   Tobacco Use   • Smoking status: Former Smoker     Packs/day: 1.00     Years: 12.00     Pack years: 12.00     Types: Cigarettes     Start date:      Last attempt to quit: 1978     Years since quittin.7   • Smokeless tobacco: Never Used   • Tobacco comment: exposed to 2nd hand smoke at work   Substance and Sexual Activity   • Alcohol use: Yes     Alcohol/week: 1.2 oz     Types: 2 Cans of beer per week     Comment: OCC    • Drug use: No   • Sexual activity: Not Currently     Partners: Male   Other Topics Concern   • Not on file   Social History Narrative   • Not on file       Review of Systems   Constitutional: Negative for activity change, appetite change, chills, diaphoresis, fatigue, fever and unexpected weight change.   HENT: Negative for congestion, dental problem, drooling, ear discharge, ear pain, facial swelling, hearing loss, mouth sores, nosebleeds, postnasal drip, rhinorrhea, sinus pressure, sneezing, sore throat, tinnitus, trouble swallowing and voice change.         Loss of sense of smell and taste   Eyes: Negative.    Respiratory: Negative.    Cardiovascular: Negative.    Gastrointestinal: Negative.    Endocrine: Negative.    Skin: Negative.    Allergic/Immunologic: Negative for environmental allergies, food allergies and immunocompromised state.   Neurological: Negative.    Hematological: Negative.    Psychiatric/Behavioral: Negative.        Vitals:    19  1147   BP: 120/80   Temp: 97.5 °F (36.4 °C)       Body mass index is 33.2 kg/m².    Objective     Physical Exam  CONSTITUTIONAL: well nourished, alert, oriented, in no acute distress     COMMUNICATION AND VOICE: able to communicate normally, normal voice quality    HEAD: normocephalic, no lesions, atraumatic, no tenderness, no masses     FACE: appearance normal, no lesions, no tenderness, no deformities, facial motion symmetric    EYES: ocular motility normal, eyelids normal, orbits normal, no proptosis, conjunctiva normal , pupils equal, round     EARS:  Hearing: response to conversational voice normal bilaterally   External Ears: auricles without lesions  Otoscopic: tympanic membrane appearance normal, no lesions, no perforation, normal mobility, no fluid    NOSE:  External Nose: structure normal, no tenderness on palpation, no nasal discharge, no lesions, no evidence of trauma, nostrils patent   Intranasal Exam: nasal mucosa normal, vestibule within normal limits, inferior turbinate normal, nasal septum midline   Nasopharynx:     ORAL:  Lips: upper and lower lips without lesion   Teeth: dentition within normal limits for age   Gums: gingivae healthy   Oral Mucosa: oral mucosa normal, no mucosal lesions   Floor of Mouth: Warthin’s duct patent, mucosa normal  Tongue: lingual mucosa normal without lesions, normal tongue mobility   Palate: soft and hard palates with normal mucosa and structure  Oropharynx: oropharyngeal mucosa normal    NECK: neck appearance normal    CHEST/RESPIRATORY: respiratory effort normal, normal breath sounds     CARDIOVASCULAR: rate and rhythm normal, extremities without cyanosis or edema      NEUROLOGIC/PSYCHIATRIC: oriented to time, place and person, mood normal, affect appropriate, CN II-XII intact grossly    Assessment/Plan   Ruth was seen today for follow-up.    Diagnoses and all orders for this visit:    Abnormal sense of taste    Loss of sense of smell      * Surgery not found *  No  orders of the defined types were placed in this encounter.    Return in about 3 months (around 5/14/2019) for Recheck sense of smell.       Patient Instructions   Will continue Flonase daily and consider antibiotic ointment is bleeding occurs. If symptoms worsen will consider MRI of the brain.       MyPlate from [employer]  The general, healthful diet is based on the 2010 Dietary Guidelines for Americans. The amount of food you need to eat from each food group depends on your age, sex, and level of physical activity and can be individualized by a dietitian. Go to ChooseMyPlate.gov for more information.  What do I need to know about the MyPlate plan?  · Enjoy your food, but eat less.  · Avoid oversized portions.  ? ½ of your plate should include fruits and vegetables.  ? ¼ of your plate should be grains.  ? ¼ of your plate should be protein.  Grains  · Make at least half of your grains whole grains.  · For a 2,000 calorie daily food plan, eat 6 oz every day.  · 1 oz is about 1 slice bread, 1 cup cereal, or ½ cup cooked rice, cereal, or pasta.  Vegetables  · Make half your plate fruits and vegetables.  · For a 2,000 calorie daily food plan, eat 2½ cups every day.  · 1 cup is about 1 cup raw or cooked vegetables or vegetable juice or 2 cups raw leafy greens.  Fruits  · Make half your plate fruits and vegetables.  · For a 2,000 calorie daily food plan, eat 2 cups every day.  · 1 cup is about 1 cup fruit or 100% fruit juice or ½ cup dried fruit.  Protein  · For a 2,000 calorie daily food plan, eat 5½ oz every day.  · 1 oz is about 1 oz meat, poultry, or fish, ¼ cup cooked beans, 1 egg, 1 Tbsp peanut butter, or ½ oz nuts or seeds.  Dairy  · Switch to fat-free or low-fat (1%) milk.  · For a 2,000 calorie daily food plan, eat 3 cups every day.  · 1 cup is about 1 cup milk or yogurt or soy milk (soy beverage), 1½ oz natural cheese, or 2 oz processed cheese.  Fats, Oils, and Empty Calories  · Only small amounts of oils are  recommended.  · Empty calories are calories from solid fats or added sugars.  · Compare sodium in foods like soup, bread, and frozen meals. Choose the foods with lower numbers.  · Drink water instead of sugary drinks.  What foods can I eat?  Grains  Whole grains such as whole wheat, quinoa, millet, and bulgur. Bread, rolls, and pasta made from whole grains. Brown or wild rice. Hot or cold cereals made from whole grains and without added sugar.  Vegetables  All fresh vegetables, especially fresh red, dark green, or orange vegetables. Peas and beans. Low-sodium frozen or canned vegetables prepared without added salt. Low-sodium vegetable juices.  Fruits  All fresh, frozen, and dried fruits. Canned fruit packed in water or fruit juice without added sugar. Fruit juices without added sugar.  Meats and Other Protein Sources  Boiled, baked, or grilled lean meat trimmed of fat. Skinless poultry. Fresh seafood and shellfish. Canned seafood packed in water. Unsalted nuts and unsalted nut butters. Tofu. Dried beans and pea. Eggs.  Dairy  Low-fat or fat-free milk, yogurt, and cheeses.  Sweets and Desserts  Frozen desserts made from low-fat milk.  Fats and Oils  Olive, peanut, and canola oils and margarine. Salad dressing and mayonnaise made from these oils.  Other  Soups and casseroles made from allowed ingredients and without added fat or salt.  The items listed above may not be a complete list of recommended foods or beverages. Contact your dietitian for more options.  What foods are not recommended?  Grains  Sweetened, low-fiber cereals. Packaged baked goods. Snack crackers and chips. Cheese crackers, butter crackers, and biscuits. Frozen waffles, sweet breads, doughnuts, pastries, packaged baking mixes, pancakes, cakes, and cookies.  Vegetables  Regular canned or frozen vegetables or vegetables prepared with salt. Canned tomatoes. Canned tomato sauce. Fried vegetables. Vegetables in cream sauce or cheese  sauce.  Fruits  Fruits packed in syrup or made with added sugar.  Meats and Other Protein Sources  Marbled or fatty meats such as ribs. Poultry with skin. Fried meats, poultry, eggs, or fish. Sausages, hot dogs, and deli meats such as pastrami, bologna, or salami.  Dairy  Whole milk, cream, cheeses made from whole milk, sour cream. Ice cream or yogurt made from whole milk or with added sugar.  Beverages  For adults, no more than one alcoholic drink per day. Regular soft drinks or other sugary beverages. Juice drinks.  Sweets and Desserts  Sugary or fatty desserts, candy, and other sweets.  Fats and Oils  Solid shortening or partially hydrogenated oils. Solid margarine. Margarine that contains trans fats. Butter.  The items listed above may not be a complete list of foods and beverages to avoid. Contact your dietitian for more information.  This information is not intended to replace advice given to you by your health care provider. Make sure you discuss any questions you have with your health care provider.  Document Released: 01/06/2009 Document Revised: 05/25/2017 Document Reviewed: 11/26/2014  [employer] Interactive Patient Education © 2018 [employer]     Calorie Counting for Weight Loss  Calories are units of energy. Your body needs a certain amount of calories from food to keep you going throughout the day. When you eat more calories than your body needs, your body stores the extra calories as fat. When you eat fewer calories than your body needs, your body burns fat to get the energy it needs.  Calorie counting means keeping track of how many calories you eat and drink each day. Calorie counting can be helpful if you need to lose weight. If you make sure to eat fewer calories than your body needs, you should lose weight. Ask your health care provider what a healthy weight is for you.  For calorie counting to work, you will need to eat the right number of calories in a day in order to lose a healthy amount of  weight per week. A dietitian can help you determine how many calories you need in a day and will give you suggestions on how to reach your calorie goal.  · A healthy amount of weight to lose per week is usually 1-2 lb (0.5-0.9 kg). This usually means that your daily calorie intake should be reduced by 500-750 calories.  · Eating 1,200 - 1,500 calories per day can help most women lose weight.  · Eating 1,500 - 1,800 calories per day can help most men lose weight.    What do I need to know about calorie counting?  In order to meet your daily calorie goal, you will need to:  · Find out how many calories are in each food you would like to eat. Try to do this before you eat.  · Decide how much of the food you plan to eat.  · Write down what you ate and how many calories it had. Doing this is called keeping a food log.    To successfully lose weight, it is important to balance calorie counting with a healthy lifestyle that includes regular activity. Aim for 150 minutes of moderate exercise (such as walking) or 75 minutes of vigorous exercise (such as running) each week.  Where do I find calorie information?    The number of calories in a food can be found on a Nutrition Facts label. If a food does not have a Nutrition Facts label, try to look up the calories online or ask your dietitian for help.  Remember that calories are listed per serving. If you choose to have more than one serving of a food, you will have to multiply the calories per serving by the amount of servings you plan to eat. For example, the label on a package of bread might say that a serving size is 1 slice and that there are 90 calories in a serving. If you eat 1 slice, you will have eaten 90 calories. If you eat 2 slices, you will have eaten 180 calories.  How do I keep a food log?  Immediately after each meal, record the following information in your food log:  · What you ate. Don't forget to include toppings, sauces, and other extras on the  "food.  · How much you ate. This can be measured in cups, ounces, or number of items.  · How many calories each food and drink had.  · The total number of calories in the meal.    Keep your food log near you, such as in a small notebook in your pocket, or use a mobile leslee or website. Some programs will calculate calories for you and show you how many calories you have left for the day to meet your goal.  What are some calorie counting tips?  · Use your calories on foods and drinks that will fill you up and not leave you hungry:  ? Some examples of foods that fill you up are nuts and nut butters, vegetables, lean proteins, and high-fiber foods like whole grains. High-fiber foods are foods with more than 5 g fiber per serving.  ? Drinks such as sodas, specialty coffee drinks, alcohol, and juices have a lot of calories, yet do not fill you up.  · Eat nutritious foods and avoid empty calories. Empty calories are calories you get from foods or beverages that do not have many vitamins or protein, such as candy, sweets, and soda. It is better to have a nutritious high-calorie food (such as an avocado) than a food with few nutrients (such as a bag of chips).  · Know how many calories are in the foods you eat most often. This will help you calculate calorie counts faster.  · Pay attention to calories in drinks. Low-calorie drinks include water and unsweetened drinks.  · Pay attention to nutrition labels for \"low fat\" or \"fat free\" foods. These foods sometimes have the same amount of calories or more calories than the full fat versions. They also often have added sugar, starch, or salt, to make up for flavor that was removed with the fat.  · Find a way of tracking calories that works for you. Get creative. Try different apps or programs if writing down calories does not work for you.  What are some portion control tips?  · Know how many calories are in a serving. This will help you know how many servings of a certain food you " can have.  · Use a measuring cup to measure serving sizes. You could also try weighing out portions on a kitchen scale. With time, you will be able to estimate serving sizes for some foods.  · Take some time to put servings of different foods on your favorite plates, bowls, and cups so you know what a serving looks like.  · Try not to eat straight from a bag or box. Doing this can lead to overeating. Put the amount you would like to eat in a cup or on a plate to make sure you are eating the right portion.  · Use smaller plates, glasses, and bowls to prevent overeating.  · Try not to multitask (for example, watch TV or use your computer) while eating. If it is time to eat, sit down at a table and enjoy your food. This will help you to know when you are full. It will also help you to be aware of what you are eating and how much you are eating.  What are tips for following this plan?  Reading food labels  · Check the calorie count compared to the serving size. The serving size may be smaller than what you are used to eating.  · Check the source of the calories. Make sure the food you are eating is high in vitamins and protein and low in saturated and trans fats.  Shopping  · Read nutrition labels while you shop. This will help you make healthy decisions before you decide to purchase your food.  · Make a grocery list and stick to it.  Cooking  · Try to cook your favorite foods in a healthier way. For example, try baking instead of frying.  · Use low-fat dairy products.  Meal planning  · Use more fruits and vegetables. Half of your plate should be fruits and vegetables.  · Include lean proteins like poultry and fish.  How do I count calories when eating out?  · Ask for smaller portion sizes.  · Consider sharing an entree and sides instead of getting your own entree.  · If you get your own entree, eat only half. Ask for a box at the beginning of your meal and put the rest of your entree in it so you are not tempted to eat  it.  · If calories are listed on the menu, choose the lower calorie options.  · Choose dishes that include vegetables, fruits, whole grains, low-fat dairy products, and lean protein.  · Choose items that are boiled, broiled, grilled, or steamed. Stay away from items that are buttered, battered, fried, or served with cream sauce. Items labeled “crispy” are usually fried, unless stated otherwise.  · Choose water, low-fat milk, unsweetened iced tea, or other drinks without added sugar. If you want an alcoholic beverage, choose a lower calorie option such as a glass of wine or light beer.  · Ask for dressings, sauces, and syrups on the side. These are usually high in calories, so you should limit the amount you eat.  · If you want a salad, choose a garden salad and ask for grilled meats. Avoid extra toppings like chan, cheese, or fried items. Ask for the dressing on the side, or ask for olive oil and vinegar or lemon to use as dressing.  · Estimate how many servings of a food you are given. For example, a serving of cooked rice is ½ cup or about the size of half a baseball. Knowing serving sizes will help you be aware of how much food you are eating at restaurants. The list below tells you how big or small some common portion sizes are based on everyday objects:  ? 1 oz--4 stacked dice.  ? 3 oz--1 deck of cards.  ? 1 tsp--1 die.  ? 1 Tbsp--½ a ping-pong ball.  ? 2 Tbsp--1 ping-pong ball.  ? ½ cup--½ baseball.  ? 1 cup--1 baseball.  Summary  · Calorie counting means keeping track of how many calories you eat and drink each day. If you eat fewer calories than your body needs, you should lose weight.  · A healthy amount of weight to lose per week is usually 1-2 lb (0.5-0.9 kg). This usually means reducing your daily calorie intake by 500-750 calories.  · The number of calories in a food can be found on a Nutrition Facts label. If a food does not have a Nutrition Facts label, try to look up the calories online or ask your  dietitian for help.  · Use your calories on foods and drinks that will fill you up, and not on foods and drinks that will leave you hungry.  · Use smaller plates, glasses, and bowls to prevent overeating.  This information is not intended to replace advice given to you by your health care provider. Make sure you discuss any questions you have with your health care provider.  Document Released: 12/18/2006 Document Revised: 11/17/2017 Document Reviewed: 11/17/2017  [employer] Interactive Patient Education © 2018 [employer]     Exercising to Lose Weight  Exercising can help you to lose weight. In order to lose weight through exercise, you need to do vigorous-intensity exercise. You can tell that you are exercising with vigorous intensity if you are breathing very hard and fast and cannot hold a conversation while exercising.  Moderate-intensity exercise helps to maintain your current weight. You can tell that you are exercising at a moderate level if you have a higher heart rate and faster breathing, but you are still able to hold a conversation.  How often should I exercise?  Choose an activity that you enjoy and set realistic goals. Your health care provider can help you to make an activity plan that works for you. Exercise regularly as directed by your health care provider. This may include:  · Doing resistance training twice each week, such as:  ? Push-ups.  ? Sit-ups.  ? Lifting weights.  ? Using resistance bands.  · Doing a given intensity of exercise for a given amount of time. Choose from these options:  ? 150 minutes of moderate-intensity exercise every week.  ? 75 minutes of vigorous-intensity exercise every week.  ? A mix of moderate-intensity and vigorous-intensity exercise every week.    Children, pregnant women, people who are out of shape, people who are overweight, and older adults may need to consult a health care provider for individual recommendations. If you have any sort of medical condition, be  sure to consult your health care provider before starting a new exercise program.  What are some activities that can help me to lose weight?  · Walking at a rate of at least 4.5 miles an hour.  · Jogging or running at a rate of 5 miles per hour.  · Biking at a rate of at least 10 miles per hour.  · Lap swimming.  · Roller-skating or in-line skating.  · Cross-country skiing.  · Vigorous competitive sports, such as football, basketball, and soccer.  · Jumping rope.  · Aerobic dancing.  How can I be more active in my day-to-day activities?  · Use the stairs instead of the elevator.  · Take a walk during your lunch break.  · If you drive, park your car farther away from work or school.  · If you take public transportation, get off one stop early and walk the rest of the way.  · Make all of your phone calls while standing up and walking around.  · Get up, stretch, and walk around every 30 minutes throughout the day.  What guidelines should I follow while exercising?  · Do not exercise so much that you hurt yourself, feel dizzy, or get very short of breath.  · Consult your health care provider prior to starting a new exercise program.  · Wear comfortable clothes and shoes with good support.  · Drink plenty of water while you exercise to prevent dehydration or heat stroke. Body water is lost during exercise and must be replaced.  · Work out until you breathe faster and your heart beats faster.  This information is not intended to replace advice given to you by your health care provider. Make sure you discuss any questions you have with your health care provider.  Document Released: 01/20/2012 Document Revised: 05/25/2017 Document Reviewed: 05/21/2015  [employer] Interactive Patient Education © 2018 [employer]

## 2019-02-14 ENCOUNTER — OFFICE VISIT (OUTPATIENT)
Dept: OTOLARYNGOLOGY | Facility: CLINIC | Age: 74
End: 2019-02-14

## 2019-02-14 VITALS
TEMPERATURE: 97.5 F | HEIGHT: 67 IN | BODY MASS INDEX: 33.27 KG/M2 | WEIGHT: 212 LBS | DIASTOLIC BLOOD PRESSURE: 80 MMHG | SYSTOLIC BLOOD PRESSURE: 120 MMHG

## 2019-02-14 DIAGNOSIS — R43.0 LOSS OF SENSE OF SMELL: ICD-10-CM

## 2019-02-14 DIAGNOSIS — R43.2 ABNORMAL SENSE OF TASTE: Primary | ICD-10-CM

## 2019-02-14 PROCEDURE — 99213 OFFICE O/P EST LOW 20 MIN: CPT | Performed by: PHYSICIAN ASSISTANT

## 2019-02-14 NOTE — PATIENT INSTRUCTIONS
Will continue Flonase daily and consider antibiotic ointment is bleeding occurs. If symptoms worsen will consider MRI of the brain.       MyPlate from [employer]  The general, healthful diet is based on the 2010 Dietary Guidelines for Americans. The amount of food you need to eat from each food group depends on your age, sex, and level of physical activity and can be individualized by a dietitian. Go to ChooseMyPlate.gov for more information.  What do I need to know about the MyPlate plan?  · Enjoy your food, but eat less.  · Avoid oversized portions.  ? ½ of your plate should include fruits and vegetables.  ? ¼ of your plate should be grains.  ? ¼ of your plate should be protein.  Grains  · Make at least half of your grains whole grains.  · For a 2,000 calorie daily food plan, eat 6 oz every day.  · 1 oz is about 1 slice bread, 1 cup cereal, or ½ cup cooked rice, cereal, or pasta.  Vegetables  · Make half your plate fruits and vegetables.  · For a 2,000 calorie daily food plan, eat 2½ cups every day.  · 1 cup is about 1 cup raw or cooked vegetables or vegetable juice or 2 cups raw leafy greens.  Fruits  · Make half your plate fruits and vegetables.  · For a 2,000 calorie daily food plan, eat 2 cups every day.  · 1 cup is about 1 cup fruit or 100% fruit juice or ½ cup dried fruit.  Protein  · For a 2,000 calorie daily food plan, eat 5½ oz every day.  · 1 oz is about 1 oz meat, poultry, or fish, ¼ cup cooked beans, 1 egg, 1 Tbsp peanut butter, or ½ oz nuts or seeds.  Dairy  · Switch to fat-free or low-fat (1%) milk.  · For a 2,000 calorie daily food plan, eat 3 cups every day.  · 1 cup is about 1 cup milk or yogurt or soy milk (soy beverage), 1½ oz natural cheese, or 2 oz processed cheese.  Fats, Oils, and Empty Calories  · Only small amounts of oils are recommended.  · Empty calories are calories from solid fats or added sugars.  · Compare sodium in foods like soup, bread, and frozen meals. Choose the foods with lower  numbers.  · Drink water instead of sugary drinks.  What foods can I eat?  Grains  Whole grains such as whole wheat, quinoa, millet, and bulgur. Bread, rolls, and pasta made from whole grains. Brown or wild rice. Hot or cold cereals made from whole grains and without added sugar.  Vegetables  All fresh vegetables, especially fresh red, dark green, or orange vegetables. Peas and beans. Low-sodium frozen or canned vegetables prepared without added salt. Low-sodium vegetable juices.  Fruits  All fresh, frozen, and dried fruits. Canned fruit packed in water or fruit juice without added sugar. Fruit juices without added sugar.  Meats and Other Protein Sources  Boiled, baked, or grilled lean meat trimmed of fat. Skinless poultry. Fresh seafood and shellfish. Canned seafood packed in water. Unsalted nuts and unsalted nut butters. Tofu. Dried beans and pea. Eggs.  Dairy  Low-fat or fat-free milk, yogurt, and cheeses.  Sweets and Desserts  Frozen desserts made from low-fat milk.  Fats and Oils  Olive, peanut, and canola oils and margarine. Salad dressing and mayonnaise made from these oils.  Other  Soups and casseroles made from allowed ingredients and without added fat or salt.  The items listed above may not be a complete list of recommended foods or beverages. Contact your dietitian for more options.  What foods are not recommended?  Grains  Sweetened, low-fiber cereals. Packaged baked goods. Snack crackers and chips. Cheese crackers, butter crackers, and biscuits. Frozen waffles, sweet breads, doughnuts, pastries, packaged baking mixes, pancakes, cakes, and cookies.  Vegetables  Regular canned or frozen vegetables or vegetables prepared with salt. Canned tomatoes. Canned tomato sauce. Fried vegetables. Vegetables in cream sauce or cheese sauce.  Fruits  Fruits packed in syrup or made with added sugar.  Meats and Other Protein Sources  Marbled or fatty meats such as ribs. Poultry with skin. Fried meats, poultry, eggs, or  fish. Sausages, hot dogs, and deli meats such as pastrami, bologna, or salami.  Dairy  Whole milk, cream, cheeses made from whole milk, sour cream. Ice cream or yogurt made from whole milk or with added sugar.  Beverages  For adults, no more than one alcoholic drink per day. Regular soft drinks or other sugary beverages. Juice drinks.  Sweets and Desserts  Sugary or fatty desserts, candy, and other sweets.  Fats and Oils  Solid shortening or partially hydrogenated oils. Solid margarine. Margarine that contains trans fats. Butter.  The items listed above may not be a complete list of foods and beverages to avoid. Contact your dietitian for more information.  This information is not intended to replace advice given to you by your health care provider. Make sure you discuss any questions you have with your health care provider.  Document Released: 01/06/2009 Document Revised: 05/25/2017 Document Reviewed: 11/26/2014  [employer] Interactive Patient Education © 2018 [employer]     Calorie Counting for Weight Loss  Calories are units of energy. Your body needs a certain amount of calories from food to keep you going throughout the day. When you eat more calories than your body needs, your body stores the extra calories as fat. When you eat fewer calories than your body needs, your body burns fat to get the energy it needs.  Calorie counting means keeping track of how many calories you eat and drink each day. Calorie counting can be helpful if you need to lose weight. If you make sure to eat fewer calories than your body needs, you should lose weight. Ask your health care provider what a healthy weight is for you.  For calorie counting to work, you will need to eat the right number of calories in a day in order to lose a healthy amount of weight per week. A dietitian can help you determine how many calories you need in a day and will give you suggestions on how to reach your calorie goal.  · A healthy amount of weight to  lose per week is usually 1-2 lb (0.5-0.9 kg). This usually means that your daily calorie intake should be reduced by 500-750 calories.  · Eating 1,200 - 1,500 calories per day can help most women lose weight.  · Eating 1,500 - 1,800 calories per day can help most men lose weight.    What do I need to know about calorie counting?  In order to meet your daily calorie goal, you will need to:  · Find out how many calories are in each food you would like to eat. Try to do this before you eat.  · Decide how much of the food you plan to eat.  · Write down what you ate and how many calories it had. Doing this is called keeping a food log.    To successfully lose weight, it is important to balance calorie counting with a healthy lifestyle that includes regular activity. Aim for 150 minutes of moderate exercise (such as walking) or 75 minutes of vigorous exercise (such as running) each week.  Where do I find calorie information?    The number of calories in a food can be found on a Nutrition Facts label. If a food does not have a Nutrition Facts label, try to look up the calories online or ask your dietitian for help.  Remember that calories are listed per serving. If you choose to have more than one serving of a food, you will have to multiply the calories per serving by the amount of servings you plan to eat. For example, the label on a package of bread might say that a serving size is 1 slice and that there are 90 calories in a serving. If you eat 1 slice, you will have eaten 90 calories. If you eat 2 slices, you will have eaten 180 calories.  How do I keep a food log?  Immediately after each meal, record the following information in your food log:  · What you ate. Don't forget to include toppings, sauces, and other extras on the food.  · How much you ate. This can be measured in cups, ounces, or number of items.  · How many calories each food and drink had.  · The total number of calories in the meal.    Keep your food  "log near you, such as in a small notebook in your pocket, or use a mobile leslee or website. Some programs will calculate calories for you and show you how many calories you have left for the day to meet your goal.  What are some calorie counting tips?  · Use your calories on foods and drinks that will fill you up and not leave you hungry:  ? Some examples of foods that fill you up are nuts and nut butters, vegetables, lean proteins, and high-fiber foods like whole grains. High-fiber foods are foods with more than 5 g fiber per serving.  ? Drinks such as sodas, specialty coffee drinks, alcohol, and juices have a lot of calories, yet do not fill you up.  · Eat nutritious foods and avoid empty calories. Empty calories are calories you get from foods or beverages that do not have many vitamins or protein, such as candy, sweets, and soda. It is better to have a nutritious high-calorie food (such as an avocado) than a food with few nutrients (such as a bag of chips).  · Know how many calories are in the foods you eat most often. This will help you calculate calorie counts faster.  · Pay attention to calories in drinks. Low-calorie drinks include water and unsweetened drinks.  · Pay attention to nutrition labels for \"low fat\" or \"fat free\" foods. These foods sometimes have the same amount of calories or more calories than the full fat versions. They also often have added sugar, starch, or salt, to make up for flavor that was removed with the fat.  · Find a way of tracking calories that works for you. Get creative. Try different apps or programs if writing down calories does not work for you.  What are some portion control tips?  · Know how many calories are in a serving. This will help you know how many servings of a certain food you can have.  · Use a measuring cup to measure serving sizes. You could also try weighing out portions on a kitchen scale. With time, you will be able to estimate serving sizes for some " foods.  · Take some time to put servings of different foods on your favorite plates, bowls, and cups so you know what a serving looks like.  · Try not to eat straight from a bag or box. Doing this can lead to overeating. Put the amount you would like to eat in a cup or on a plate to make sure you are eating the right portion.  · Use smaller plates, glasses, and bowls to prevent overeating.  · Try not to multitask (for example, watch TV or use your computer) while eating. If it is time to eat, sit down at a table and enjoy your food. This will help you to know when you are full. It will also help you to be aware of what you are eating and how much you are eating.  What are tips for following this plan?  Reading food labels  · Check the calorie count compared to the serving size. The serving size may be smaller than what you are used to eating.  · Check the source of the calories. Make sure the food you are eating is high in vitamins and protein and low in saturated and trans fats.  Shopping  · Read nutrition labels while you shop. This will help you make healthy decisions before you decide to purchase your food.  · Make a grocery list and stick to it.  Cooking  · Try to cook your favorite foods in a healthier way. For example, try baking instead of frying.  · Use low-fat dairy products.  Meal planning  · Use more fruits and vegetables. Half of your plate should be fruits and vegetables.  · Include lean proteins like poultry and fish.  How do I count calories when eating out?  · Ask for smaller portion sizes.  · Consider sharing an entree and sides instead of getting your own entree.  · If you get your own entree, eat only half. Ask for a box at the beginning of your meal and put the rest of your entree in it so you are not tempted to eat it.  · If calories are listed on the menu, choose the lower calorie options.  · Choose dishes that include vegetables, fruits, whole grains, low-fat dairy products, and lean  protein.  · Choose items that are boiled, broiled, grilled, or steamed. Stay away from items that are buttered, battered, fried, or served with cream sauce. Items labeled “crispy” are usually fried, unless stated otherwise.  · Choose water, low-fat milk, unsweetened iced tea, or other drinks without added sugar. If you want an alcoholic beverage, choose a lower calorie option such as a glass of wine or light beer.  · Ask for dressings, sauces, and syrups on the side. These are usually high in calories, so you should limit the amount you eat.  · If you want a salad, choose a garden salad and ask for grilled meats. Avoid extra toppings like chan, cheese, or fried items. Ask for the dressing on the side, or ask for olive oil and vinegar or lemon to use as dressing.  · Estimate how many servings of a food you are given. For example, a serving of cooked rice is ½ cup or about the size of half a baseball. Knowing serving sizes will help you be aware of how much food you are eating at restaurants. The list below tells you how big or small some common portion sizes are based on everyday objects:  ? 1 oz--4 stacked dice.  ? 3 oz--1 deck of cards.  ? 1 tsp--1 die.  ? 1 Tbsp--½ a ping-pong ball.  ? 2 Tbsp--1 ping-pong ball.  ? ½ cup--½ baseball.  ? 1 cup--1 baseball.  Summary  · Calorie counting means keeping track of how many calories you eat and drink each day. If you eat fewer calories than your body needs, you should lose weight.  · A healthy amount of weight to lose per week is usually 1-2 lb (0.5-0.9 kg). This usually means reducing your daily calorie intake by 500-750 calories.  · The number of calories in a food can be found on a Nutrition Facts label. If a food does not have a Nutrition Facts label, try to look up the calories online or ask your dietitian for help.  · Use your calories on foods and drinks that will fill you up, and not on foods and drinks that will leave you hungry.  · Use smaller plates, glasses,  and bowls to prevent overeating.  This information is not intended to replace advice given to you by your health care provider. Make sure you discuss any questions you have with your health care provider.  Document Released: 12/18/2006 Document Revised: 11/17/2017 Document Reviewed: 11/17/2017  [employer] Interactive Patient Education © 2018 [employer]     Exercising to Lose Weight  Exercising can help you to lose weight. In order to lose weight through exercise, you need to do vigorous-intensity exercise. You can tell that you are exercising with vigorous intensity if you are breathing very hard and fast and cannot hold a conversation while exercising.  Moderate-intensity exercise helps to maintain your current weight. You can tell that you are exercising at a moderate level if you have a higher heart rate and faster breathing, but you are still able to hold a conversation.  How often should I exercise?  Choose an activity that you enjoy and set realistic goals. Your health care provider can help you to make an activity plan that works for you. Exercise regularly as directed by your health care provider. This may include:  · Doing resistance training twice each week, such as:  ? Push-ups.  ? Sit-ups.  ? Lifting weights.  ? Using resistance bands.  · Doing a given intensity of exercise for a given amount of time. Choose from these options:  ? 150 minutes of moderate-intensity exercise every week.  ? 75 minutes of vigorous-intensity exercise every week.  ? A mix of moderate-intensity and vigorous-intensity exercise every week.    Children, pregnant women, people who are out of shape, people who are overweight, and older adults may need to consult a health care provider for individual recommendations. If you have any sort of medical condition, be sure to consult your health care provider before starting a new exercise program.  What are some activities that can help me to lose weight?  · Walking at a rate of at least  4.5 miles an hour.  · Jogging or running at a rate of 5 miles per hour.  · Biking at a rate of at least 10 miles per hour.  · Lap swimming.  · Roller-skating or in-line skating.  · Cross-country skiing.  · Vigorous competitive sports, such as football, basketball, and soccer.  · Jumping rope.  · Aerobic dancing.  How can I be more active in my day-to-day activities?  · Use the stairs instead of the elevator.  · Take a walk during your lunch break.  · If you drive, park your car farther away from work or school.  · If you take public transportation, get off one stop early and walk the rest of the way.  · Make all of your phone calls while standing up and walking around.  · Get up, stretch, and walk around every 30 minutes throughout the day.  What guidelines should I follow while exercising?  · Do not exercise so much that you hurt yourself, feel dizzy, or get very short of breath.  · Consult your health care provider prior to starting a new exercise program.  · Wear comfortable clothes and shoes with good support.  · Drink plenty of water while you exercise to prevent dehydration or heat stroke. Body water is lost during exercise and must be replaced.  · Work out until you breathe faster and your heart beats faster.  This information is not intended to replace advice given to you by your health care provider. Make sure you discuss any questions you have with your health care provider.  Document Released: 01/20/2012 Document Revised: 05/25/2017 Document Reviewed: 05/21/2015  [employer] Interactive Patient Education © 2018 [employer]

## 2019-04-08 ENCOUNTER — OFFICE VISIT (OUTPATIENT)
Dept: CARDIOLOGY | Facility: CLINIC | Age: 74
End: 2019-04-08

## 2019-04-08 VITALS
DIASTOLIC BLOOD PRESSURE: 78 MMHG | HEIGHT: 67 IN | WEIGHT: 207.2 LBS | OXYGEN SATURATION: 96 % | BODY MASS INDEX: 32.52 KG/M2 | HEART RATE: 50 BPM | SYSTOLIC BLOOD PRESSURE: 124 MMHG

## 2019-04-08 DIAGNOSIS — E66.9 OBESITY (BMI 30.0-34.9): Chronic | ICD-10-CM

## 2019-04-08 DIAGNOSIS — I47.1 SUPRAVENTRICULAR TACHYCARDIA (HCC): Chronic | ICD-10-CM

## 2019-04-08 DIAGNOSIS — I25.10 CORONARY ARTERY DISEASE INVOLVING NATIVE CORONARY ARTERY OF NATIVE HEART WITHOUT ANGINA PECTORIS: Primary | Chronic | ICD-10-CM

## 2019-04-08 DIAGNOSIS — E78.00 HYPERCHOLESTEROLEMIA: Chronic | ICD-10-CM

## 2019-04-08 DIAGNOSIS — I47.29 NONSUSTAINED VENTRICULAR TACHYCARDIA (HCC): Chronic | ICD-10-CM

## 2019-04-08 PROBLEM — R00.1 BRADYCARDIA: Chronic | Status: ACTIVE | Noted: 2018-10-23

## 2019-04-08 PROCEDURE — 99214 OFFICE O/P EST MOD 30 MIN: CPT | Performed by: NURSE PRACTITIONER

## 2019-04-08 PROCEDURE — 93000 ELECTROCARDIOGRAM COMPLETE: CPT | Performed by: NURSE PRACTITIONER

## 2019-04-08 RX ORDER — ESCITALOPRAM OXALATE 20 MG/1
20 TABLET ORAL DAILY
COMMUNITY

## 2019-04-08 NOTE — ASSESSMENT & PLAN NOTE
Continue simvastatin but consider increasing back to 40 mg. Goal LDL less than 70. Due for yearly labs this month.

## 2019-04-08 NOTE — PROGRESS NOTES
Subjective:     Encounter Date:04/08/2019    Chief Complaint:    Patient ID: Ruth Ramsay is a 73 y.o. female here today for 6 month cardiac follow-up. She is accompanied by her . She stays active and exercises on the elliptical a couple of times per week. She has been eating more now that able to smell/taste a little better.     HPI     Coronary Artery Disease      Additional comments: Doing well. No chest discomfort. Mild inferior and septal ischemia on nuclear stress 6/2018, didn't tolerate metoprolol, tolerating aspirin, nebivolol, and statin              Rapid Heart Rate      Additional comments: never felt palpitations, resting heart rates 50-60s on nebivolol, no near syncope or syncope              Hypertension      Additional comments: well controlled with medications          Last edited by Jeanette Mcgraw APRN on 4/8/2019 10:01 AM. (History)        History:   Past Medical History:   Diagnosis Date   • Abnormal ECG    • Acid reflux    • Arrhythmia    • Cardiac arrhythmia    • Cataract    • Coronary artery disease 06/2018    abnormal nuclear stress (inferior and septal hypokinesis)   • Dizziness and giddiness    • Hypercholesterolemia    • Hypertension    • Left bundle branch block    • Nonsustained ventricular tachycardia (CMS/HCC)    • Supraventricular tachycardia (CMS/HCC)      Past Surgical History:   Procedure Laterality Date   • BUNIONECTOMY     • CATARACT EXTRACTION, BILATERAL     • FINGER/THUMB ARTHROPLASTY      thumb surgery   • HYSTERECTOMY     • REPLACEMENT TOTAL KNEE Right      Social History     Socioeconomic History   • Marital status:      Spouse name: Not on file   • Number of children: Not on file   • Years of education: Not on file   • Highest education level: Not on file   Tobacco Use   • Smoking status: Former Smoker     Packs/day: 1.00     Years: 12.00     Pack years: 12.00     Types: Cigarettes     Start date: 1966     Last attempt to quit: 6/1/1978     Years  since quittin.8   • Smokeless tobacco: Never Used   • Tobacco comment: exposed to 2nd hand smoke at work   Substance and Sexual Activity   • Alcohol use: Yes     Alcohol/week: 1.2 oz     Types: 2 Cans of beer per week     Comment: OCC    • Drug use: No   • Sexual activity: Not Currently     Partners: Male     Family History   Problem Relation Age of Onset   • Kidney disease Mother    • COPD Brother    • Lung cancer Maternal Aunt    • Dementia Maternal Grandmother    • Allergic rhinitis Brother    • Osteoarthritis Brother        Outpatient Medications Marked as Taking for the 19 encounter (Office Visit) with Jeanette Mcgraw APRN   Medication Sig Dispense Refill   • aspirin 81 MG EC tablet Take 81 mg by mouth Daily.     • Biotin 1 MG capsule Take 1 capsule by mouth Daily.     • calcium carbonate-cholecalciferol (CALCIUM 500 +D) 500-400 MG-UNIT tablet tablet Take 2 tablets by mouth Daily. Taking 1200 mg w/D, 1000 IU     • Cholecalciferol (VITAMIN D3) 1000 units capsule Take 1,000 Units by mouth Daily.     • clonazePAM (KlonoPIN) 0.5 MG tablet Take 0.5 mg by mouth 3 (Three) Times a Day As Needed for Anxiety.     • coenzyme Q10 100 MG capsule Take 100 mg by mouth 2 (Two) Times a Day.     • diclofenac sodium (VOTAREN XR) 100 MG 24 hr tablet Take 50 mg by mouth Daily.     • escitalopram (LEXAPRO) 10 MG tablet Take 10 mg by mouth Daily.     • fluticasone (FLONASE) 50 MCG/ACT nasal spray 2 sprays into the nostril(s) as directed by provider Daily. 16 g 11   • montelukast (SINGULAIR) 10 MG tablet Take 10 mg by mouth every night at bedtime.     • Multiple Vitamins-Minerals (HAIR SKIN AND NAILS FORMULA PO) Take 1 capsule by mouth Daily.     • nebivolol (BYSTOLIC) 2.5 MG tablet Take 0.5 tablets by mouth Daily. (Patient taking differently: Take 0.625 mg by mouth Daily.) 45 tablet 3   • nitrofurantoin (MACRODANTIN) 50 MG capsule Take 1 capsule by mouth Every Night. 30 capsule 6   • NON FORMULARY Take 1 tablet by  mouth 2 (Two) Times a Day. Joint Advantage Gold      • Omega-3 Fatty Acids (FISH OIL) 1200 MG capsule delayed-release Take 2,400 mg by mouth 2 (Two) Times a Day.     • omeprazole (priLOSEC) 40 MG capsule Take 40 mg by mouth Daily.     • polyethylene glycol (MIRALAX) powder Take 17 g by mouth Daily As Needed (constipation).     • Probiotic Product (PROBIOTIC FORMULA PO) Take 1 capsule by mouth Daily.     • raNITIdine (ZANTAC) 150 MG tablet Take 300 mg by mouth Every Night.     • simvastatin (ZOCOR) 20 MG tablet Take 20 mg by mouth Every Night.     • vitamin B-6 (PYRIDOXINE) 100 MG tablet Take 100 mg by mouth Daily.     • Zinc 50 MG tablet Take 50 mg by mouth Daily.         Review of Systems:  Review of Systems   Constitution: Negative for chills, decreased appetite, fever, weakness and malaise/fatigue.   HENT: Negative for congestion and nosebleeds.         Altered sense of smell/taste a little improved after using nasal spray   Eyes: Negative for blurred vision and double vision.   Cardiovascular: Negative for chest pain, dyspnea on exertion, irregular heartbeat, leg swelling, near-syncope, palpitations and syncope.   Respiratory: Negative for cough (nightly), shortness of breath, sputum production and wheezing.    Endocrine: Positive for heat intolerance. Negative for cold intolerance.   Hematologic/Lymphatic: Bruises/bleeds easily.   Skin: Positive for dry skin. Negative for itching and rash.   Musculoskeletal: Positive for arthritis, back pain, joint pain and joint swelling. Negative for falls, muscle cramps, neck pain and stiffness.   Gastrointestinal: Negative for abdominal pain, constipation, diarrhea, heartburn, melena, nausea and vomiting.   Genitourinary: Positive for frequency. Negative for dysuria and hematuria.   Neurological: Positive for dizziness, light-headedness and loss of balance. Negative for excessive daytime sleepiness, headaches and numbness.   Psychiatric/Behavioral: Positive for depression  "and memory loss (mild, short term). The patient has insomnia and is nervous/anxious.             Objective:   /78 (BP Location: Left arm, Patient Position: Sitting, Cuff Size: Adult)   Pulse 50   Ht 170.2 cm (67\")   Wt 94 kg (207 lb 3.2 oz)   SpO2 96%   BMI 32.45 kg/m²   Wt Readings from Last 3 Encounters:   04/08/19 94 kg (207 lb 3.2 oz)   02/14/19 96.2 kg (212 lb)   01/04/19 95.3 kg (210 lb)       Physical Exam   Constitutional: She is oriented to person, place, and time. She appears well-developed and well-nourished.   Eyes: No scleral icterus.   Neck: No JVD present.   Cardiovascular: Normal rate, regular rhythm, normal heart sounds and intact distal pulses. Exam reveals no gallop and no friction rub.   No murmur heard.  Pulmonary/Chest: Effort normal and breath sounds normal.   Musculoskeletal: She exhibits no edema.   Neurological: She is alert and oriented to person, place, and time.   Skin: Skin is warm and dry.   Psychiatric: She has a normal mood and affect.   Vitals reviewed.    Lab/Diagnostics Review:   6/25/2018 Stress sestamibi  Mildly diminished perfusion involving the inferior wall and septum near the apex reperfuses at rest consistent with mild inferior and septal ischemia, EF 66%, as read by Dr. Sha Palafox     05/21/2018 48 hour Holter monitor occasional atrial ectopic beats with runs of nonsustained supraventricular tachycardia and occasional ventricular ectopic beats with runs of nonsustained VT up to 4 seconds.  Patient symptoms associated with sinus rhythm.     05/21/2018 ultrasound, thyroid total of 2 solid nodules in the right thyroid lobe most likely representing benign nodules.  Recommended follow-up ultrasound in 6 months to one year to assess for stability.  As read by Dr. Mauro Rm     5/9/2018 ultrasound carotid bilateral carotid system demonstrates mild without stenosis of the internal carotid artery, left carotid system demonstrates moderate plaque without stenosis of the " internal carotid artery.  Vertebral was antegrade flow.  Mass in the right lobe of the thyroid.  Intermittent cardiac arrhythmia noted.     4/30/2018   Lipid panel , , HDL 64,   Hepatic Function Panel alkp 67, ast 28, alt 38, albumin 3.9, Total bili 0.5      3/14/2018  EKG  sinus rhythm 72 bpm with a deviation, QRS and 40 ms, left bundle branch block versus intraventricular conduction delay.  Poor quality copy, possible old anteroseptal infarct..     3/15/2018  CBC WBC 4200, Hgb 14.0, Hct 42.8%, plt 211,000  CMP Na 139, K 3.8, Cl 104, CO2 26, BUN 16, Cr 0.9, total protein 6.4, albumin 3.8, alkp 80, ast 40, alt 56  Vitamin B12 >1500  TSH 1.91, Free T4 0.99, Free T3 3.05   HS-CRP 19.6  Vitamin D 60.6     ECG 12 Lead  Date/Time: 6/1/2018 9:59 AM  Performed by: JEANETTE AREVALO  Authorized by: JEANETTE AREVALO   Comparison: compared with previous ECG from 3/14/2018  Similar to previous ECG  Rhythm: sinus bradycardia  Rate: bradycardic  BPM: 57  Conduction: non-specific intraventricular conduction delay  QRS axis: left  Clinical impression: abnormal ECG  Clinical impression comment: Probable old anteroseptal infarct      ECG 12 Lead  Date/Time: 4/8/2019 5:28 PM  Performed by: Jeanette Arevalo APRN  Authorized by: Jeanette Arevalo APRN   Comparison: compared with previous ECG from 6/1/2018  Comparison to previous ECG: Heart rate has decreased.   Rhythm: sinus bradycardia  Rate: bradycardic  BPM: 46  Conduction: left bundle branch block    Clinical impression: abnormal EKG                 Assessment/Plan:         Problem List Items Addressed This Visit        Cardiovascular and Mediastinum    Nonsustained ventricular tachycardia (CMS/HCC) (Chronic)    Overview     Nonsustained up to 4 beats per 5/2018 Holter         Current Assessment & Plan     Stable on low dose beta-blocker         Supraventricular tachycardia (CMS/HCC) (Chronic)    Overview     Nonsustained per 5/2018 Holter          Current Assessment & Plan     Stable on low dose beta-blocker         Hypercholesterolemia (Chronic)    Current Assessment & Plan     Continue simvastatin but consider increasing back to 40 mg. Goal LDL less than 70. Due for yearly labs this month.          Coronary artery disease - Primary (Chronic)    Overview     abnormal nuclear stress 6/2018 (mild inferior and septal hypokinesis)         Current Assessment & Plan     Stable, asymptomatic without angina. Continue medical therapy.             Other    Obesity (BMI 30.0-34.9) (Chronic)    Current Assessment & Plan     Encouraged healthy diet and routine aerobic exercise.              Return in about 1 year (around 4/8/2020) for Recheck.           Jeanette Mcgraw, APRN, ACNP-BC, CHFN-BC

## 2019-07-10 ENCOUNTER — TELEPHONE (OUTPATIENT)
Dept: CARDIOLOGY | Facility: CLINIC | Age: 74
End: 2019-07-10

## 2019-09-20 ENCOUNTER — TELEPHONE (OUTPATIENT)
Dept: NEUROSURGERY | Age: 74
End: 2019-09-20

## 2019-09-24 ENCOUNTER — TELEPHONE (OUTPATIENT)
Dept: NEUROSURGERY | Age: 74
End: 2019-09-24

## 2019-10-04 ENCOUNTER — HOSPITAL ENCOUNTER (OUTPATIENT)
Dept: GENERAL RADIOLOGY | Age: 74
Discharge: HOME | End: 2019-10-04
Payer: MEDICARE

## 2019-10-04 ENCOUNTER — OFFICE VISIT (OUTPATIENT)
Dept: NEUROSURGERY | Age: 74
End: 2019-10-04
Payer: MEDICARE

## 2019-10-04 ENCOUNTER — TELEPHONE (OUTPATIENT)
Dept: NEUROSURGERY | Age: 74
End: 2019-10-04

## 2019-10-04 VITALS
DIASTOLIC BLOOD PRESSURE: 73 MMHG | SYSTOLIC BLOOD PRESSURE: 125 MMHG | HEIGHT: 66 IN | HEART RATE: 46 BPM | BODY MASS INDEX: 34.23 KG/M2 | WEIGHT: 213 LBS

## 2019-10-04 DIAGNOSIS — M79.604 RIGHT LEG PAIN: ICD-10-CM

## 2019-10-04 DIAGNOSIS — G89.29 CHRONIC MIDLINE LOW BACK PAIN WITH RIGHT-SIDED SCIATICA: ICD-10-CM

## 2019-10-04 DIAGNOSIS — M54.41 CHRONIC MIDLINE LOW BACK PAIN WITH RIGHT-SIDED SCIATICA: ICD-10-CM

## 2019-10-04 DIAGNOSIS — R26.89 ANTALGIC GAIT: ICD-10-CM

## 2019-10-04 DIAGNOSIS — M51.37 DDD (DEGENERATIVE DISC DISEASE), LUMBOSACRAL: ICD-10-CM

## 2019-10-04 DIAGNOSIS — G89.29 CHRONIC MIDLINE LOW BACK PAIN WITH RIGHT-SIDED SCIATICA: Primary | ICD-10-CM

## 2019-10-04 DIAGNOSIS — M54.41 CHRONIC MIDLINE LOW BACK PAIN WITH RIGHT-SIDED SCIATICA: Primary | ICD-10-CM

## 2019-10-04 DIAGNOSIS — M51.36 DDD (DEGENERATIVE DISC DISEASE), LUMBAR: ICD-10-CM

## 2019-10-04 PROCEDURE — 1123F ACP DISCUSS/DSCN MKR DOCD: CPT | Performed by: NURSE PRACTITIONER

## 2019-10-04 PROCEDURE — G8417 CALC BMI ABV UP PARAM F/U: HCPCS | Performed by: NURSE PRACTITIONER

## 2019-10-04 PROCEDURE — 72110 X-RAY EXAM L-2 SPINE 4/>VWS: CPT

## 2019-10-04 PROCEDURE — G8400 PT W/DXA NO RESULTS DOC: HCPCS | Performed by: NURSE PRACTITIONER

## 2019-10-04 PROCEDURE — 1090F PRES/ABSN URINE INCON ASSESS: CPT | Performed by: NURSE PRACTITIONER

## 2019-10-04 PROCEDURE — 99204 OFFICE O/P NEW MOD 45 MIN: CPT | Performed by: NURSE PRACTITIONER

## 2019-10-04 PROCEDURE — G8484 FLU IMMUNIZE NO ADMIN: HCPCS | Performed by: NURSE PRACTITIONER

## 2019-10-04 PROCEDURE — 1036F TOBACCO NON-USER: CPT | Performed by: NURSE PRACTITIONER

## 2019-10-04 PROCEDURE — 3017F COLORECTAL CA SCREEN DOC REV: CPT | Performed by: NURSE PRACTITIONER

## 2019-10-04 PROCEDURE — 4040F PNEUMOC VAC/ADMIN/RCVD: CPT | Performed by: NURSE PRACTITIONER

## 2019-10-04 PROCEDURE — G8427 DOCREV CUR MEDS BY ELIG CLIN: HCPCS | Performed by: NURSE PRACTITIONER

## 2019-10-04 RX ORDER — ASPIRIN 81 MG/1
81 TABLET ORAL DAILY
COMMUNITY

## 2019-10-04 RX ORDER — GABAPENTIN 100 MG/1
1 CAPSULE ORAL DAILY
COMMUNITY
Start: 2019-08-29

## 2019-10-04 RX ORDER — FAMOTIDINE 40 MG/1
1 TABLET, FILM COATED ORAL 2 TIMES DAILY
COMMUNITY

## 2019-10-04 RX ORDER — NICOTINE POLACRILEX 2 MG
1 GUM BUCCAL DAILY
COMMUNITY

## 2019-10-04 RX ORDER — RANITIDINE 300 MG/1
1 TABLET ORAL DAILY
COMMUNITY
Start: 2019-07-12

## 2019-10-04 RX ORDER — HYDROCODONE BITARTRATE AND ACETAMINOPHEN 5; 325 MG/1; MG/1
1 TABLET ORAL DAILY
COMMUNITY
Start: 2019-09-26

## 2019-10-04 RX ORDER — ESCITALOPRAM OXALATE 20 MG/1
1 TABLET ORAL DAILY
COMMUNITY
Start: 2019-08-30

## 2019-10-04 RX ORDER — SIMVASTATIN 20 MG
1 TABLET ORAL DAILY
COMMUNITY

## 2019-10-04 RX ORDER — NEBIVOLOL 2.5 MG/1
1.25 TABLET ORAL DAILY
COMMUNITY
Start: 2018-10-23 | End: 2019-10-23

## 2019-10-04 RX ORDER — PANTOPRAZOLE SODIUM 40 MG/1
1 TABLET, DELAYED RELEASE ORAL DAILY
COMMUNITY
Start: 2019-08-08

## 2019-10-04 SDOH — HEALTH STABILITY: MENTAL HEALTH: HOW OFTEN DO YOU HAVE A DRINK CONTAINING ALCOHOL?: NEVER

## 2019-10-04 ASSESSMENT — ENCOUNTER SYMPTOMS
GASTROINTESTINAL NEGATIVE: 1
EYES NEGATIVE: 1
RESPIRATORY NEGATIVE: 1

## 2019-10-16 ENCOUNTER — TELEPHONE (OUTPATIENT)
Dept: NEUROSURGERY | Age: 74
End: 2019-10-16

## 2019-12-09 ENCOUNTER — TELEPHONE (OUTPATIENT)
Dept: NEUROSURGERY | Age: 74
End: 2019-12-09

## 2019-12-20 ENCOUNTER — HOSPITAL ENCOUNTER (OUTPATIENT)
Dept: GENERAL RADIOLOGY | Age: 74
Discharge: HOME | End: 2019-12-20
Payer: MEDICARE

## 2019-12-20 ENCOUNTER — OFFICE VISIT (OUTPATIENT)
Dept: NEUROSURGERY | Age: 74
End: 2019-12-20
Payer: MEDICARE

## 2019-12-20 VITALS
HEART RATE: 57 BPM | HEIGHT: 67 IN | SYSTOLIC BLOOD PRESSURE: 121 MMHG | BODY MASS INDEX: 33.9 KG/M2 | DIASTOLIC BLOOD PRESSURE: 69 MMHG | WEIGHT: 216 LBS

## 2019-12-20 DIAGNOSIS — M25.551 RIGHT HIP PAIN: Primary | ICD-10-CM

## 2019-12-20 DIAGNOSIS — M54.41 CHRONIC MIDLINE LOW BACK PAIN WITH RIGHT-SIDED SCIATICA: ICD-10-CM

## 2019-12-20 DIAGNOSIS — M51.37 DDD (DEGENERATIVE DISC DISEASE), LUMBOSACRAL: ICD-10-CM

## 2019-12-20 DIAGNOSIS — M79.604 RIGHT LEG PAIN: ICD-10-CM

## 2019-12-20 DIAGNOSIS — G89.29 CHRONIC MIDLINE LOW BACK PAIN WITH RIGHT-SIDED SCIATICA: ICD-10-CM

## 2019-12-20 DIAGNOSIS — M51.36 DDD (DEGENERATIVE DISC DISEASE), LUMBAR: ICD-10-CM

## 2019-12-20 DIAGNOSIS — R26.89 ANTALGIC GAIT: ICD-10-CM

## 2019-12-20 DIAGNOSIS — M25.551 RIGHT HIP PAIN: ICD-10-CM

## 2019-12-20 PROCEDURE — G8400 PT W/DXA NO RESULTS DOC: HCPCS | Performed by: NURSE PRACTITIONER

## 2019-12-20 PROCEDURE — G8417 CALC BMI ABV UP PARAM F/U: HCPCS | Performed by: NURSE PRACTITIONER

## 2019-12-20 PROCEDURE — 73502 X-RAY EXAM HIP UNI 2-3 VIEWS: CPT

## 2019-12-20 PROCEDURE — 4040F PNEUMOC VAC/ADMIN/RCVD: CPT | Performed by: NURSE PRACTITIONER

## 2019-12-20 PROCEDURE — 1123F ACP DISCUSS/DSCN MKR DOCD: CPT | Performed by: NURSE PRACTITIONER

## 2019-12-20 PROCEDURE — 1036F TOBACCO NON-USER: CPT | Performed by: NURSE PRACTITIONER

## 2019-12-20 PROCEDURE — 99213 OFFICE O/P EST LOW 20 MIN: CPT | Performed by: NURSE PRACTITIONER

## 2019-12-20 PROCEDURE — 3017F COLORECTAL CA SCREEN DOC REV: CPT | Performed by: NURSE PRACTITIONER

## 2019-12-20 PROCEDURE — G8484 FLU IMMUNIZE NO ADMIN: HCPCS | Performed by: NURSE PRACTITIONER

## 2019-12-20 PROCEDURE — 1090F PRES/ABSN URINE INCON ASSESS: CPT | Performed by: NURSE PRACTITIONER

## 2019-12-20 PROCEDURE — G8428 CUR MEDS NOT DOCUMENT: HCPCS | Performed by: NURSE PRACTITIONER

## 2019-12-20 ASSESSMENT — ENCOUNTER SYMPTOMS
EYES NEGATIVE: 1
RESPIRATORY NEGATIVE: 1
GASTROINTESTINAL NEGATIVE: 1

## 2020-01-13 ENCOUNTER — HOSPITAL ENCOUNTER (OUTPATIENT)
Dept: PAIN MANAGEMENT | Age: 75
Discharge: HOME | End: 2020-01-13
Payer: MEDICARE

## 2020-01-13 VITALS
BODY MASS INDEX: 33 KG/M2 | WEIGHT: 210.25 LBS | TEMPERATURE: 97.1 F | DIASTOLIC BLOOD PRESSURE: 72 MMHG | RESPIRATION RATE: 18 BRPM | HEIGHT: 67 IN | SYSTOLIC BLOOD PRESSURE: 130 MMHG | HEART RATE: 53 BPM | OXYGEN SATURATION: 95 %

## 2020-01-13 PROCEDURE — 99211 OFF/OP EST MAY X REQ PHY/QHP: CPT

## 2020-01-13 ASSESSMENT — PAIN SCALES - GENERAL: PAINLEVEL_OUTOF10: 6

## 2020-01-13 ASSESSMENT — PAIN DESCRIPTION - PAIN TYPE: TYPE: CHRONIC PAIN

## 2020-01-13 ASSESSMENT — PAIN DESCRIPTION - ORIENTATION: ORIENTATION: LOWER;RIGHT

## 2020-01-13 ASSESSMENT — PAIN DESCRIPTION - LOCATION: LOCATION: BACK;HIP

## 2020-01-13 NOTE — PROGRESS NOTES
Nursing Admission Record    Current Issues / Falls / ER Visits:  New patient referral for right hip pain and low back pain    Injection In the Past? None    Opioids Prescribed: Norco 5mg    Was Medication Brought to Appt: No    Hx of any Neck/Back Surgeries? None    Is Patient currently taking a blood thinner?  Yes, Aspirin    MRI exams received in the past 2 years:  No       When na                                               Where na       Imaging on chart: No         Imaging records requested: No    CT exam received during the last 12 months: Yes CT Lumbar Spine       When 9/2019                                              Where [facility]       Imaging on chart: Yes         Imaging records requested: No    X-ray exam received during the last 12 months: Yes XR Right Hip/Pelvis & XR Lumbar Spine       When 2019                                              Where [facility]       Imaging on chart: Yes         Imaging records requested: No    Nerve Conduction Study/EMG:  No       When na                                              Where na       Imaging on chart: No         Imaging records requested: No    Physical therapy: Yes       When: 2019                        Where  [facility] No       When: na                        Where  na    Labs  Yes       When: 2019                                             Where  Dr. Nando Parekh    PEG Score: 7.7    Last PEG Score: N/A    Annual ORT Score: 0    Annual PHQ Score: 6    Last UDS Results: N/A    Education Provided:  [x] Review of Anam  [x] Agreement Review  [x] PEG Score Calculated [x] PHQ Score Calculated [x] ORT Score Calculated    [] Compliance Issues Discussed [] Cognitive Behavior Needs [x] Exercise [] Review of Test [] Financial Issues  [x] Tobacco/Alcohol Use Reviewed [x] Teaching [x] New Patient [x] Picture Obtained    Physician Plan:  [] Outgoing Referral  [] Pharmacy Consult  [] Test Ordered [] Prescription Ordered/Changed [] Blood Thinner Request Form  [] Obtained Test Results / Consult Notes  [x] UDS due at next visit, verified per EPIC      [] Suspected Physical Abuse or Suicide Risk assessed - IF YES COMPLETE QUESTIONS BELOW    If any of the following questions are answered yes - contact attending physician for referral:    Has been considering harming self to escape stress, pain problems? [] YES  [x] NO  Has a suicide plan? [] YES  [x] NO  Has attempted suicide in the past?   [] YES  [x] NO  Has a close friend or family member who committed suicide?   [x] YES  [] NO    Assessment Completed by:  Electronically signed by Omar Garcia RN on 1/13/2020 at 8:12 AM

## 2020-03-19 ENCOUNTER — TELEPHONE (OUTPATIENT)
Dept: NEUROSURGERY | Age: 75
End: 2020-03-19

## 2021-07-30 ENCOUNTER — OFFICE VISIT (OUTPATIENT)
Dept: CARDIOLOGY | Facility: CLINIC | Age: 76
End: 2021-07-30

## 2021-07-30 VITALS
OXYGEN SATURATION: 97 % | BODY MASS INDEX: 33.09 KG/M2 | HEART RATE: 56 BPM | DIASTOLIC BLOOD PRESSURE: 80 MMHG | SYSTOLIC BLOOD PRESSURE: 132 MMHG | HEIGHT: 67 IN | WEIGHT: 210.8 LBS

## 2021-07-30 DIAGNOSIS — E66.9 OBESITY (BMI 30.0-34.9): Chronic | ICD-10-CM

## 2021-07-30 DIAGNOSIS — R53.83 OTHER FATIGUE: ICD-10-CM

## 2021-07-30 DIAGNOSIS — R00.1 BRADYCARDIA: Chronic | ICD-10-CM

## 2021-07-30 DIAGNOSIS — E07.89 THYROID MASS OF UNCLEAR ETIOLOGY: ICD-10-CM

## 2021-07-30 DIAGNOSIS — E87.6 HYPOKALEMIA: ICD-10-CM

## 2021-07-30 DIAGNOSIS — I25.10 CORONARY ARTERY DISEASE INVOLVING NATIVE CORONARY ARTERY OF NATIVE HEART WITHOUT ANGINA PECTORIS: Chronic | ICD-10-CM

## 2021-07-30 DIAGNOSIS — I47.1 SUPRAVENTRICULAR TACHYCARDIA (HCC): Primary | Chronic | ICD-10-CM

## 2021-07-30 DIAGNOSIS — I47.29 NONSUSTAINED VENTRICULAR TACHYCARDIA (HCC): Chronic | ICD-10-CM

## 2021-07-30 DIAGNOSIS — I49.3 FREQUENT PVCS: ICD-10-CM

## 2021-07-30 DIAGNOSIS — E78.00 HYPERCHOLESTEROLEMIA: Chronic | ICD-10-CM

## 2021-07-30 PROBLEM — R68.89 DECREASED EXERCISE TOLERANCE: Status: ACTIVE | Noted: 2021-07-30

## 2021-07-30 PROCEDURE — 99215 OFFICE O/P EST HI 40 MIN: CPT | Performed by: NURSE PRACTITIONER

## 2021-07-30 PROCEDURE — 93000 ELECTROCARDIOGRAM COMPLETE: CPT | Performed by: NURSE PRACTITIONER

## 2021-07-30 RX ORDER — MEMANTINE HYDROCHLORIDE 5 MG/1
5 TABLET ORAL 2 TIMES DAILY
COMMUNITY

## 2021-07-30 RX ORDER — GABAPENTIN 300 MG/1
300 CAPSULE ORAL NIGHTLY
COMMUNITY

## 2021-07-30 RX ORDER — MONTELUKAST SODIUM 10 MG/1
10 TABLET ORAL NIGHTLY
COMMUNITY

## 2021-07-30 RX ORDER — LEVETIRACETAM 500 MG/1
500 TABLET ORAL DAILY
COMMUNITY
Start: 2021-07-17

## 2021-07-30 RX ORDER — FAMOTIDINE 40 MG/1
40 TABLET, FILM COATED ORAL NIGHTLY
COMMUNITY

## 2021-07-30 NOTE — ASSESSMENT & PLAN NOTE
Check TSH, Free T4, and Free T3 - may need US or FNA. Defer further workup (if not already done) to Pema Kong NP.

## 2021-07-30 NOTE — ASSESSMENT & PLAN NOTE
Was stable on low dose beta-blocker but now no longer taking. PSVT per recent Holter and no complaints of palpitations.

## 2021-07-30 NOTE — ASSESSMENT & PLAN NOTE
Patient's (Body mass index is 33.02 kg/m².) indicates that they are obese (BMI >30) with obesity-related health conditions that include coronary heart disease and dyslipidemias . Obesity is worsening. BMI is is above average; BMI management plan is completed. We discussed healthy diet.

## 2021-07-30 NOTE — ASSESSMENT & PLAN NOTE
Will consider checking chemical or nuclear stress test if normal thyroid levels or no other cause of decreased exercise tolerance.

## 2021-07-30 NOTE — ASSESSMENT & PLAN NOTE
Will check cardiac stress and ADONAY on RA if thyroid labs are normal. Possibly due to some of her medications, deconditioning, and/or neurologic/psychologic causes.

## 2021-07-30 NOTE — PROGRESS NOTES
Encounter Date:07/30/2021  Chief Complaint:   Subjective    Ruth Ramsay is a 76 y.o. female who presents to [facility] today for overdue yearly cardiac follow-up. Accompanied by her . Had labs in the hospital in May when had encephalitis and a seizure. No further seizures. Potassium was low 2.8 and improved to 4.2. Hasn't had labs since then. Afraid to do anything - no energy/fatigue/exhausted all the time/stumbles occasionally. Had recent Holter. Seeing Dr. Beyer.      History of Present Illness   HPI     Coronary Artery Disease      Additional comments: No chest discomfort. Abnormal nuclear stress 6/2018 (mild inferior and septal hypokinesis) treated medically.              Slow Heart Rate      Additional comments: recent holter monitor unchanged except increased PVCs - PVC burden not reported              Fatigue      Additional comments: worsened - no improvement off Bystolic, unknown if low HRs on Holter occured at night or during day, min HR 35, max , no pauses              Premature Ventricular Contractions      Additional comments: 22,481 per 48 Hr Holter 7/19/21 ready by Dr. Spicer          Last edited by Jeanette Mcgraw APRN on 7/30/2021  5:00 PM. (History)        History: Past medical, surgical, family, and social history reviewed.    Outpatient Medications Marked as Taking for the 7/30/21 encounter (Office Visit) with Jeanette Mcgraw APRN   Medication Sig Dispense Refill   • aspirin 81 MG EC tablet Take 81 mg by mouth Daily.     • calcium carbonate-cholecalciferol (CALCIUM 500 +D) 500-400 MG-UNIT tablet tablet Take 2 tablets by mouth Daily. Taking 1200 mg w/D, 1000 IU     • Cholecalciferol (VITAMIN D3) 1000 units capsule Take 2,000 Units by mouth Daily.     • clonazePAM (KlonoPIN) 0.5 MG tablet Take 0.5 mg by mouth 3 (Three) Times a Day As Needed for Anxiety.     • coenzyme Q10 100 MG capsule Take 100 mg by mouth 2 (Two) Times a Day.     •  "escitalopram (LEXAPRO) 10 MG tablet Take 10 mg by mouth Daily.     • famotidine (PEPCID) 40 MG tablet Take 40 mg by mouth Every Night.     • gabapentin (NEURONTIN) 300 MG capsule Take 300 mg by mouth Every Night.     • levETIRAcetam (KEPPRA) 500 MG tablet 2 (two) times a day.     • memantine (NAMENDA) 5 MG tablet Take 5 mg by mouth 2 (Two) Times a Day.     • montelukast (SINGULAIR) 10 MG tablet Take 10 mg by mouth Every Night.     • Multiple Vitamins-Minerals (HAIR SKIN AND NAILS FORMULA PO) Take 1 capsule by mouth Daily.     • Omega-3 Fatty Acids (FISH OIL) 1200 MG capsule delayed-release Take 2,400 mg by mouth 2 (Two) Times a Day.     • omeprazole (priLOSEC) 40 MG capsule Take 40 mg by mouth Daily.     • Probiotic Product (PROBIOTIC FORMULA PO) Take 1 capsule by mouth 2 (Two) Times a Day.     • simvastatin (ZOCOR) 20 MG tablet Take 20 mg by mouth Every Night.     • [DISCONTINUED] Biotin 1 MG capsule Take 1 capsule by mouth Daily.          Objective     Vital Signs:   /80 (BP Location: Left arm, Patient Position: Sitting, Cuff Size: Adult)   Pulse 56   Ht 170.2 cm (67\")   Wt 95.6 kg (210 lb 12.8 oz)   SpO2 97%   BMI 33.02 kg/m²   Wt Readings from Last 3 Encounters:   07/30/21 95.6 kg (210 lb 12.8 oz)   04/08/19 94 kg (207 lb 3.2 oz)   02/14/19 96.2 kg (212 lb)         Vitals reviewed.   Constitutional:       Appearance: Well-developed and well-groomed.   Eyes:      General: No scleral icterus.     Comments: Wears glasses   Neck:      Vascular: No JVD.   Pulmonary:      Effort: Pulmonary effort is normal.      Breath sounds: Normal breath sounds.   Cardiovascular:      Bradycardia present. Regular rhythm.      No gallop.   Pulses:     Intact distal pulses.   Edema:     Peripheral edema present.     Ankle: bilateral 1+ pitting edema of the ankle.  Skin:     General: Skin is warm and dry.   Neurological:      Mental Status: Alert, oriented to person, place, and time and oriented to person, place and time. "      Gait: Gait abnormal (mild unsteadiness).   Psychiatric:         Behavior: Behavior is slowed (very slightly at first, improved during visit). Behavior is cooperative.         Result Review  Data Reviewed:  The following data was reviewed by: KERLINE Neville on 07/30/2021    719/21 48Hr Holter, Health system, Dr. Spicer  22,481 PVCs, 1201 PACs, minimum HR 35 (sinus bradycardia) and maximum  (sinus tachycardia). Maximum R-R interval 2.3 seconds indicating no significant pauses.     6/13/21 Carotid US, Health system, Dr. Alvarez  Mild to moderate R ICA and moderate L ICA plaque (no significant change). Antegrade vertebrals. Multiple masses R lobe thyroid.    5/7/21 & 5/10/21 labs reviewed from Health system    5/4/21 CTA chest Health system bilateral lower lobe atelectasis, worse on R, no evidence of PE. Dr. Palafox.    Scan on 11/25/2019 by Cayetano Maldonado MD: Health system 48HR HOLTER MONITOR 11/25/2019  ECG 12 Lead (04/08/2019 09:30)  Scan on 6/27/2018 0824 by Genie Fuentes CMA: STRESS ECHO 06/25/2018           ECG 12 Lead    Date/Time: 7/30/2021 3:02 PM  Performed by: Jeanette Mcgraw APRN  Authorized by: Jeanette Mcgraw APRN   Comparison: compared with previous ECG from 4/8/2019  Comparison to previous ECG: HR has increased from 46 bpm  Rhythm: sinus bradycardia  Rate: bradycardic  BPM: 54  Conduction: non-specific intraventricular conduction delay  QRS axis: right    Clinical impression: non-specific ECG               Assessment and Plan   Problem List Items Addressed This Visit        Cardiac and Vasculature    Bradycardia (Chronic)    Current Assessment & Plan     Low grade with no clear indication of chronotropic incompetence per recent Holter with max HR up to 117 in a fairly sedentary individual. However, given increased PVCs may need to consider pacemaker to allow for beta-blockade.         Relevant Orders    ECG 12 Lead    TSH    T4, Free    T3, Free    Coronary artery disease (Chronic)    Overview     abnormal nuclear  stress 6/2018 (mild inferior and septal hypokinesis)         Current Assessment & Plan     Will consider checking chemical or nuclear stress test if normal thyroid levels or no other cause of decreased exercise tolerance.         Frequent PVCs    Current Assessment & Plan     Worsened off beta-blocker.         Hypercholesterolemia (Chronic)    Current Assessment & Plan     Unknown control on simvastatin. Requested copy of most recent lipid panel for review.         Nonsustained ventricular tachycardia (CMS/HCC) (Chronic)    Overview     Nonsustained up to 4 beats per 5/2018 Holter         Current Assessment & Plan     PVCs have increased per recent Holter off low dose beta-blocker. May need to consider pacemaker for sick sinus syndrome/aydee-tachy syndrome to allow for beta-blocker for suppression of PVCs. May need to check echocardiogram for systolic dysfunction.          Relevant Orders    ECG 12 Lead    Magnesium    Supraventricular tachycardia (CMS/HCC) - Primary (Chronic)    Overview     Nonsustained per 5/2018 Holter         Current Assessment & Plan     Was stable on low dose beta-blocker but now no longer taking. PSVT per recent Holter and no complaints of palpitations.          Relevant Orders    ECG 12 Lead       Endocrine and Metabolic    Obesity (BMI 30.0-34.9) (Chronic)    Current Assessment & Plan     Patient's (Body mass index is 33.02 kg/m².) indicates that they are obese (BMI >30) with obesity-related health conditions that include coronary heart disease and dyslipidemias . Obesity is worsening. BMI is is above average; BMI management plan is completed. We discussed healthy diet.          Thyroid mass of unclear etiology    Current Assessment & Plan     Check TSH, Free T4, and Free T3 - may need US or FNA. Defer further workup (if not already done) to Pema Kong NP.            Symptoms and Signs    Other fatigue    Current Assessment & Plan     Will check cardiac stress and ADONAY on RA if thyroid  labs are normal. Possibly due to some of her medications, deconditioning, and/or neurologic/psychologic causes.            Other Visit Diagnoses     Hypokalemia        Relevant Orders    Basic Metabolic Panel        Patient was given instructions and counseling regarding her condition or for health maintenance advice. Please see specific information pulled into the AVS if appropriate.    Advance Care Planning    ACP discussion was held with the patient during this visit. Patient has an advance directive (not in EMR), copy requested.    Follow Up :   Return in about 2 months (around 9/30/2021) for Recheck.       Time Spent: I spent 58 minutes caring for Ruth on this date of service. This time includes time spent by me in the following activities: reviewing tests, obtaining and/or reviewing a separately obtained history, performing a medically appropriate examination and/or evaluation, counseling and educating the patient/family/caregiver, ordering medications, tests, or procedures and documenting information in the medical record.     I spent 3 minutes on the separately reported service of EKG. This time is not included in the time used to support the E/M service also reported today.        Jeanette Mcgraw, APRN, ACNP-BC, CHFN-BC

## 2021-07-30 NOTE — ASSESSMENT & PLAN NOTE
PVCs have increased per recent Holter off low dose beta-blocker. May need to consider pacemaker for sick sinus syndrome/aydee-tachy syndrome to allow for beta-blocker for suppression of PVCs. May need to check echocardiogram for systolic dysfunction.

## 2021-07-30 NOTE — ASSESSMENT & PLAN NOTE
Low grade with no clear indication of chronotropic incompetence per recent Holter with max HR up to 117 in a fairly sedentary individual. However, given increased PVCs may need to consider pacemaker to allow for beta-blockade.

## 2021-08-13 ENCOUNTER — TELEPHONE (OUTPATIENT)
Dept: CARDIOLOGY | Facility: CLINIC | Age: 76
End: 2021-08-13

## 2021-08-13 NOTE — TELEPHONE ENCOUNTER
Patient states that she had labs done at [place] office , I called office to get labs but office is closed.

## 2021-08-13 NOTE — TELEPHONE ENCOUNTER
Call pt to see if she has had labs done as I ordered a few weeks ago. Pema Kong NP reached out to me that she is complaining of being dizzy all the time with heart rates in the 40-50s. TX!

## 2021-08-16 DIAGNOSIS — R42 DIZZINESS AND GIDDINESS: ICD-10-CM

## 2021-08-16 DIAGNOSIS — I25.118 CORONARY ARTERY DISEASE INVOLVING NATIVE CORONARY ARTERY OF NATIVE HEART WITH OTHER FORM OF ANGINA PECTORIS (HCC): Primary | ICD-10-CM

## 2021-08-16 DIAGNOSIS — R06.09 DYSPNEA ON EXERTION: ICD-10-CM

## 2021-08-16 DIAGNOSIS — R00.1 BRADYCARDIA: ICD-10-CM

## 2021-08-16 DIAGNOSIS — I47.29 NONSUSTAINED VENTRICULAR TACHYCARDIA (HCC): ICD-10-CM

## 2021-08-16 DIAGNOSIS — I49.3 FREQUENT PVCS: ICD-10-CM

## 2021-08-31 ENCOUNTER — OUTSIDE FACILITY SERVICE (OUTPATIENT)
Dept: CARDIOLOGY | Facility: CLINIC | Age: 76
End: 2021-08-31

## 2021-08-31 PROCEDURE — 93306 TTE W/DOPPLER COMPLETE: CPT | Performed by: INTERNAL MEDICINE

## 2021-08-31 PROCEDURE — 93018 CV STRESS TEST I&R ONLY: CPT | Performed by: INTERNAL MEDICINE

## 2021-09-01 DIAGNOSIS — I49.3 FREQUENT PVCS: ICD-10-CM

## 2021-09-01 DIAGNOSIS — R00.1 BRADYCARDIA: ICD-10-CM

## 2021-09-01 DIAGNOSIS — R42 DIZZINESS AND GIDDINESS: ICD-10-CM

## 2021-09-01 DIAGNOSIS — I47.29 NONSUSTAINED VENTRICULAR TACHYCARDIA (HCC): ICD-10-CM

## 2021-09-01 DIAGNOSIS — R06.09 DYSPNEA ON EXERTION: ICD-10-CM

## 2021-09-01 DIAGNOSIS — I25.118 CORONARY ARTERY DISEASE INVOLVING NATIVE CORONARY ARTERY OF NATIVE HEART WITH OTHER FORM OF ANGINA PECTORIS (HCC): ICD-10-CM

## 2021-09-01 NOTE — PROGRESS NOTES
Please notify patient of normal heart pumping function (EF 60-65%) with a little stiffening and a little leak in aortic valve - all fairly normal for her age and nothing to explain her frequent PVCs (premature ventricular beats) seen on recent Holter. Her nuclear stress was low risk. She has some slow heart rates while sleeping. We need to check her oxygen levels at night while she sleeps. If oxygen is ok while sleeping, recommend referral to EP (electrophysiology) to address frequent PVCs. Let me know if any further questions or concerns. TX!

## 2021-09-01 NOTE — PROGRESS NOTES
Please notify patient of low risk nuclear stress test. Her frequent PVCs are most likely secondary to being off beta-blocker due to low heart rate. Await echocardiogram results. TX!

## 2021-09-02 DIAGNOSIS — I49.3 FREQUENT PVCS: Primary | ICD-10-CM

## 2021-09-02 DIAGNOSIS — R00.1 BRADYCARDIA: ICD-10-CM

## 2021-09-02 DIAGNOSIS — R06.09 DYSPNEA ON EXERTION: ICD-10-CM

## 2021-09-02 NOTE — PROGRESS NOTES
Advise patient and  of results and they want to know if this is leading up to patient giving a pacemaker?

## 2021-09-09 DIAGNOSIS — R00.1 BRADYCARDIA: ICD-10-CM

## 2021-09-09 DIAGNOSIS — I49.3 FREQUENT PVCS: ICD-10-CM

## 2021-09-09 DIAGNOSIS — R06.09 DYSPNEA ON EXERTION: ICD-10-CM

## 2021-09-10 DIAGNOSIS — I49.3 FREQUENT PVCS: ICD-10-CM

## 2021-09-10 DIAGNOSIS — I47.29 NONSUSTAINED VENTRICULAR TACHYCARDIA (HCC): ICD-10-CM

## 2021-09-10 DIAGNOSIS — R00.1 BRADYCARDIA: Primary | ICD-10-CM

## 2021-09-10 NOTE — PROGRESS NOTES
Notified patient and  of normal ADONAY on RA. No need for oxygen or sleep study at this time. She is agreeable to referral to EP for bradycardia and frequent PVCs.

## 2021-09-24 ENCOUNTER — OFFICE VISIT (OUTPATIENT)
Dept: CARDIOLOGY | Facility: CLINIC | Age: 76
End: 2021-09-24

## 2021-09-24 VITALS
BODY MASS INDEX: 33.27 KG/M2 | OXYGEN SATURATION: 98 % | HEIGHT: 67 IN | HEART RATE: 63 BPM | WEIGHT: 212 LBS | DIASTOLIC BLOOD PRESSURE: 80 MMHG | SYSTOLIC BLOOD PRESSURE: 134 MMHG

## 2021-09-24 DIAGNOSIS — I47.1 SUPRAVENTRICULAR TACHYCARDIA (HCC): Chronic | ICD-10-CM

## 2021-09-24 DIAGNOSIS — E66.9 OBESITY (BMI 30.0-34.9): Chronic | ICD-10-CM

## 2021-09-24 DIAGNOSIS — I49.3 FREQUENT PVCS: Chronic | ICD-10-CM

## 2021-09-24 DIAGNOSIS — I47.29 NONSUSTAINED VENTRICULAR TACHYCARDIA (HCC): Chronic | ICD-10-CM

## 2021-09-24 DIAGNOSIS — E78.00 HYPERCHOLESTEROLEMIA: Chronic | ICD-10-CM

## 2021-09-24 DIAGNOSIS — I25.10 CORONARY ARTERY DISEASE INVOLVING NATIVE CORONARY ARTERY OF NATIVE HEART WITHOUT ANGINA PECTORIS: Chronic | ICD-10-CM

## 2021-09-24 DIAGNOSIS — R00.1 BRADYCARDIA: Primary | Chronic | ICD-10-CM

## 2021-09-24 PROCEDURE — 99214 OFFICE O/P EST MOD 30 MIN: CPT | Performed by: NURSE PRACTITIONER

## 2021-09-24 NOTE — ASSESSMENT & PLAN NOTE
PVCs have increased per recent Holter off low dose beta-blocker. May need to consider pacemaker for sick sinus syndrome/aydee-tachy syndrome to allow for beta-blocker for suppression of PVCs. Normal systolic function per echo and low risk nuclear stress. Normal electrolytes. Normal ADONAY on RA. Await EP recommendations.

## 2021-09-24 NOTE — ASSESSMENT & PLAN NOTE
Low grade with no clear indication of chronotropic incompetence per recent Holter with max HR up to 117 in a fairly sedentary individual. However, given increased PVCs may need to consider pacemaker to allow for beta-blockade. Await EP recommendations.

## 2021-09-24 NOTE — PROGRESS NOTES
Encounter Date:09/24/2021  Chief Complaint:   Subjective    Ruth Ramsay is a 76 y.o. female who presents to [facility] today for two month cardiac follow-up. She and  are fully vaccinated for COVID-19. She continues to not feel well. She has an appointment to see electrolphsyiologist Dr. De La Paz next week.      History of Present Illness   HPI     Rapid Heart Rate      Additional comments: no palpitations, frequent PVCs per recent Holter, complains of no energy, normal potassium, Mg, thyroid - low risk stress 8/2021              Coronary Artery Disease      Additional comments: low risk nuclear stress 8/2021, no chest discomfort, mild inferior and septal hypokinesis per 6/2018 nuclear stress          Last edited by Jeanette Mcgraw APRN on 9/24/2021  4:13 PM. (History)        History: Past medical, surgical, family, and social history reviewed.    Outpatient Medications Marked as Taking for the 9/24/21 encounter (Office Visit) with Jeanette Mcgraw APRN   Medication Sig Dispense Refill   • aspirin 81 MG EC tablet Take 81 mg by mouth Daily.     • calcium carbonate-cholecalciferol (CALCIUM 500 +D) 500-400 MG-UNIT tablet tablet Take 2 tablets by mouth Daily. Taking 1200 mg w/D, 1000 IU     • Cholecalciferol (VITAMIN D3) 1000 units capsule Take 2,000 Units by mouth Daily.     • clonazePAM (KlonoPIN) 0.5 MG tablet Take 0.5 mg by mouth 3 (Three) Times a Day As Needed for Anxiety.     • coenzyme Q10 100 MG capsule Take 100 mg by mouth 2 (Two) Times a Day.     • escitalopram (LEXAPRO) 20 MG tablet Take 20 mg by mouth Daily.     • famotidine (PEPCID) 40 MG tablet Take 40 mg by mouth Every Night.     • gabapentin (NEURONTIN) 300 MG capsule Take 300 mg by mouth Every Night.     • levETIRAcetam (KEPPRA) 500 MG tablet 500 mg 2 (two) times a day.     • memantine (NAMENDA) 5 MG tablet Take 5 mg by mouth 2 (Two) Times a Day.     • montelukast (SINGULAIR) 10 MG tablet Take 10 mg  "by mouth Every Night.     • Multiple Vitamins-Minerals (HAIR SKIN AND NAILS FORMULA PO) Take 1 capsule by mouth Daily.     • Omega-3 Fatty Acids (FISH OIL) 1200 MG capsule delayed-release Take 2,400 mg by mouth 2 (Two) Times a Day.     • omeprazole (priLOSEC) 40 MG capsule Take 40 mg by mouth Daily.     • Probiotic Product (PROBIOTIC FORMULA PO) Take 1 capsule by mouth 2 (Two) Times a Day.     • simvastatin (ZOCOR) 20 MG tablet Take 20 mg by mouth Every Night.          Objective     Vital Signs:   /80 (BP Location: Left arm, Patient Position: Sitting, Cuff Size: Adult)   Pulse 63   Ht 170.2 cm (67.01\")   Wt 96.2 kg (212 lb)   SpO2 98%   BMI 33.20 kg/m²   Wt Readings from Last 3 Encounters:   09/24/21 96.2 kg (212 lb)   07/30/21 95.6 kg (210 lb 12.8 oz)   04/08/19 94 kg (207 lb 3.2 oz)         Vitals reviewed.   Constitutional:       Appearance: Well-developed and well-groomed. Obese.   Eyes:      General: No scleral icterus.     Comments: Wears glasses   Neck:      Vascular: No JVD.   Pulmonary:      Effort: Pulmonary effort is normal.      Breath sounds: Normal breath sounds.   Cardiovascular:      Bradycardia present. Occasional ectopic beats. Regular rhythm.      No gallop.   Pulses:     Intact distal pulses.   Edema:     Peripheral edema absent.   Skin:     General: Skin is warm and dry.   Neurological:      Mental Status: Alert, oriented to person, place, and time and oriented to person, place and time.      Gait: Gait abnormal (mild unsteadiness).   Psychiatric:         Mood and Affect: Mood is depressed (mildly).         Behavior: Behavior is slowed (very slightly at first, improved during visit). Behavior is cooperative.         Result Review  Data Reviewed:  The following data was reviewed by: KERLINE Neville on 09/24/2021  Scan on 9/9/2021 1546 by Ines Ramsay RegSched Rep: OVERNIGHT O2/ LEGACY/ 9-8-2021    Scan on 9/1/2021 0847 by Ines Ramsay RegSched Rep: LEXISCAN CARDIO STRESS " TEST/ [facility]/ 8-  Scan on 8/5/2021 by Jeanette Mcgraw, APRN: CMP,LIPID,TSH,T4,A1C,CBC     Scan on 7/19/2021 by Jeanette Mcgraw, KERLINE: HOLTER PVC BURDEN 7/19/2021  Scan on 7/19/2021 by Jeanette Mcgraw, APRN: HOLTER MONITOR/ [facility]/ 7-  Scan on 6/16/2021 by Jeanette Mcgraw, APRN: US CAROTID/ [facility]/ 6-                   Assessment and Plan   Problem List Items Addressed This Visit        Cardiac and Vasculature    Bradycardia - Primary (Chronic)    Current Assessment & Plan     Low grade with no clear indication of chronotropic incompetence per recent Holter with max HR up to 117 in a fairly sedentary individual. However, given increased PVCs may need to consider pacemaker to allow for beta-blockade. Await EP recommendations.         Coronary artery disease (Chronic)    Overview     abnormal nuclear stress 6/2018 (mild inferior and septal hypokinesis)  Low risk nuclear stress 8/2021         Current Assessment & Plan     Continue medical management and risk factor modification. Call if chest discomfort.         Frequent PVCs (Chronic)    Current Assessment & Plan     Reviewed Holter results. Await Dr. De La Paz's recommendations regarding whether to restart beta-blocker, do EP study, ablation, and/or implant pacemaker. She is symptomatic with fatigue and dizziness but not having near syncope or syncope at this time.         Hypercholesterolemia (Chronic)    Current Assessment & Plan     Well controlled with simvastatin per 8/2021 labs with normal LFTs. Continue present therapy.          Nonsustained ventricular tachycardia (CMS/HCC) (Chronic)    Overview     Nonsustained up to 4 beats per 5/2018 Holter         Current Assessment & Plan     PVCs have increased per recent Holter off low dose beta-blocker. May need to consider pacemaker for sick sinus syndrome/aydee-tachy syndrome to allow for beta-blocker for suppression of PVCs. Normal systolic function per echo and low risk nuclear  stress. Normal electrolytes. Normal ADONAY on RA. Await EP recommendations.          Supraventricular tachycardia (CMS/HCC) (Chronic)    Overview     Nonsustained per 5/2018 Holter         Current Assessment & Plan     Was stable on low dose beta-blocker but now no longer taking. Very short run of PSVT per 2019 Holter and frequent PVCs per recent Holter 7/2021. No palpitations. Normal ADONAY on RA. Normal electrolytes. Low risk nuclear stress. Await EP evaluation.            Endocrine and Metabolic    Obesity (BMI 30.0-34.9) (Chronic)    Current Assessment & Plan     Patient's (Body mass index is 33.2 kg/m².) indicates that they are obese (BMI >30) with health conditions that include hypertension, coronary heart disease and dyslipidemias . Weight is worsening. BMI is is above average; BMI management plan is completed. We discussed portion control and increasing exercise.              Patient was given instructions and counseling regarding her condition or for health maintenance advice. Please see specific information pulled into the AVS if appropriate.    Advance Care Planning   ACP discussion was held with the patient during this visit. Patient has an advance directive (not in EMR), copy requested.    Follow Up :   Return in about 3 months (around 12/24/2021) for Recheck.             Jeanette Mcgraw, APRN, ACNP-BC, CHFN-BC

## 2021-09-24 NOTE — ASSESSMENT & PLAN NOTE
Was stable on low dose beta-blocker but now no longer taking. Very short run of PSVT per 2019 Holter and frequent PVCs per recent Holter 7/2021. No palpitations. Normal ADONAY on RA. Normal electrolytes. Low risk nuclear stress. Await EP evaluation.

## 2021-09-24 NOTE — ASSESSMENT & PLAN NOTE
Patient's (Body mass index is 33.2 kg/m².) indicates that they are obese (BMI >30) with health conditions that include hypertension, coronary heart disease and dyslipidemias . Weight is worsening. BMI is is above average; BMI management plan is completed. We discussed portion control and increasing exercise.

## 2021-09-24 NOTE — ASSESSMENT & PLAN NOTE
Reviewed Holter results. Await Dr. De La Paz's recommendations regarding whether to restart beta-blocker, do EP study, ablation, and/or implant pacemaker. She is symptomatic with fatigue and dizziness but not having near syncope or syncope at this time.

## 2021-09-27 ENCOUNTER — TELEPHONE (OUTPATIENT)
Dept: CARDIOLOGY | Facility: CLINIC | Age: 76
End: 2021-09-27

## 2021-09-27 NOTE — TELEPHONE ENCOUNTER
Patients spouse called stating he faxed living will and she takes 20 mg of esotalopram (lexapro). He stated you had inquired about this.    I have added living will to be scanned.    Thank you

## 2021-11-04 ENCOUNTER — TELEPHONE (OUTPATIENT)
Dept: CARDIOLOGY | Facility: CLINIC | Age: 76
End: 2021-11-04

## 2021-11-04 NOTE — TELEPHONE ENCOUNTER
Pt , Adriel called this morning.  Ruth had an ablation a couple of weeks ago and she is still feeling very fatigued with a heart rate that is staying in the mid to high 40's.  He expressed that the pt has no energy and just really wants some answers and to feel better.  He inquired about a pacemaker for her.  I saw in media that there is some correspondence from Dr. De La Paz.    Please advise.    Thank you    *UPDATE*  Pt  called back stating that someone from Dr. De La Paz's office called and they are going to do a holter monitor on Ruth and they also reduced her Namenda.

## 2021-11-04 NOTE — TELEPHONE ENCOUNTER
Called pt  and pt back to let them know that you are awaiting Dr. De La Paz's evaluation.  They expressed understanding.

## 2021-11-18 ENCOUNTER — OFFICE VISIT (OUTPATIENT)
Dept: CARDIOLOGY | Facility: CLINIC | Age: 76
End: 2021-11-18

## 2021-11-18 VITALS
SYSTOLIC BLOOD PRESSURE: 122 MMHG | WEIGHT: 217 LBS | HEART RATE: 51 BPM | OXYGEN SATURATION: 94 % | DIASTOLIC BLOOD PRESSURE: 85 MMHG | HEIGHT: 67 IN | BODY MASS INDEX: 34.06 KG/M2

## 2021-11-18 DIAGNOSIS — E78.00 HYPERCHOLESTEROLEMIA: Chronic | ICD-10-CM

## 2021-11-18 DIAGNOSIS — I25.10 CORONARY ARTERY DISEASE INVOLVING NATIVE CORONARY ARTERY OF NATIVE HEART WITHOUT ANGINA PECTORIS: Chronic | ICD-10-CM

## 2021-11-18 DIAGNOSIS — R00.1 BRADYCARDIA: Primary | Chronic | ICD-10-CM

## 2021-11-18 DIAGNOSIS — I47.29 NONSUSTAINED VENTRICULAR TACHYCARDIA (HCC): Chronic | ICD-10-CM

## 2021-11-18 DIAGNOSIS — I47.1 SUPRAVENTRICULAR TACHYCARDIA (HCC): Chronic | ICD-10-CM

## 2021-11-18 DIAGNOSIS — I49.3 FREQUENT PVCS: Chronic | ICD-10-CM

## 2021-11-18 DIAGNOSIS — E66.9 OBESITY (BMI 30.0-34.9): Chronic | ICD-10-CM

## 2021-11-18 PROCEDURE — 99214 OFFICE O/P EST MOD 30 MIN: CPT | Performed by: NURSE PRACTITIONER

## 2021-11-18 RX ORDER — ACETAMINOPHEN 500 MG
500 TABLET ORAL EVERY 6 HOURS PRN
COMMUNITY

## 2021-11-18 NOTE — ASSESSMENT & PLAN NOTE
Remains low grade with no clear indication of chronotropic incompetence on previous Holter with max HR up to 117 in a fairly sedentary individual. However, given persistent symptoms s/p ablation will await results of repeat Holter and await Dr. De La Paz's (EP) recommendations regarding possible pacemaker.

## 2021-11-18 NOTE — ASSESSMENT & PLAN NOTE
Denies significant palpitations. Await repeat Holter results and EP recommendations.Normal ADONAY on RA. Normal electrolytes. Low risk nuclear stress.

## 2021-11-18 NOTE — PROGRESS NOTES
Encounter Date:11/18/2021  Chief Complaint:   Subjective    Ruth Ramsay is a 76 y.o. female who presents to [facility] today for cardiac follow-up sooner than scheduled 3 month at the request of her primary care provider Pema Kong NP. Patient reports she is angry and irritable and tired of not feeling well. She is also being evaluated by EP Dr. De La Paz who recommended a heart monitor after her recent ablation for frequent PVCs. Her  notified us Dr. De La Paz had recommended she decrease Namenda per 11/4/21 phone call. Today he reports they decreased her Keppra to 500 mg daily. Review of Dr. Beyer's (neurology) notes from 9/9/21 office visit recommends she stay on Keppra 500 mg BID due to high seizure activity during hospitalization earlier this year or consider changing to Lamictal.       History of Present Illness   HPI     Slow Heart Rate      Additional comments: staying high 40s to low 50s, Dr. De La Paz ordered heart monitor 2 weeks ago and returned last Friday - hasn't heard results. Weak and tired all the time, gets dizzy with walking - but not all the time. No near syncope or syncope. Complains of poor balance which is worsening. 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 Dr. De La Paz had them decrease Keppra to once a day -  initially reported it was Namenda he wanted them to decrease.              Frequent PVCs      Additional comments: 15% PVC burden, had ablation by Dr. De La Paz about a month ago in [address]              Coronary Artery Disease      Additional comments: low risk nuclear stress 8/2021, no chest discomfort, mild inferior and septal hypokinesis per 6/2018 nuclear stress          Last edited by Jeanette Mcgraw APRN on 11/18/2021 11:04 AM. (History)        History: Past medical, surgical, family, and social history reviewed.    Outpatient Medications Marked as Taking for the 11/18/21 encounter (Office Visit) with Jeanette Mcgraw  "APRN   Medication Sig Dispense Refill   • acetaminophen (TYLENOL) 500 MG tablet Take 500 mg by mouth Every 6 (Six) Hours As Needed for Mild Pain .     • aspirin 81 MG EC tablet Take 81 mg by mouth Daily.     • calcium carbonate-cholecalciferol (CALCIUM 500 +D) 500-400 MG-UNIT tablet tablet Take 2 tablets by mouth Daily. Taking 1200 mg w/D, 1000 IU     • Cholecalciferol (VITAMIN D3) 1000 units capsule Take 2,000 Units by mouth Daily.     • clonazePAM (KlonoPIN) 0.5 MG tablet Take 0.5 mg by mouth 3 (Three) Times a Day As Needed for Anxiety.     • coenzyme Q10 100 MG capsule Take 100 mg by mouth 2 (Two) Times a Day.     • escitalopram (LEXAPRO) 20 MG tablet Take 20 mg by mouth Daily.     • famotidine (PEPCID) 40 MG tablet Take 40 mg by mouth Every Night.     • gabapentin (NEURONTIN) 300 MG capsule Take 300 mg by mouth Every Night.     • levETIRAcetam (KEPPRA) 500 MG tablet 500 mg Daily. Taking in the morning per dr owens     • memantine (NAMENDA) 5 MG tablet Take 5 mg by mouth 2 (Two) Times a Day.     • montelukast (SINGULAIR) 10 MG tablet Take 10 mg by mouth Every Night.     • Multiple Vitamins-Minerals (HAIR SKIN AND NAILS FORMULA PO) Take 1 capsule by mouth Daily.     • Omega-3 Fatty Acids (FISH OIL) 1200 MG capsule delayed-release Take 2,400 mg by mouth 2 (Two) Times a Day.     • omeprazole (priLOSEC) 40 MG capsule Take 40 mg by mouth Daily.     • Probiotic Product (PROBIOTIC FORMULA PO) Take 1 capsule by mouth 2 (Two) Times a Day.     • simvastatin (ZOCOR) 20 MG tablet Take 20 mg by mouth Every Night.          Objective     Vital Signs:   /85 (BP Location: Left arm, Patient Position: Sitting, Cuff Size: Adult)   Pulse 51   Ht 170.2 cm (67.01\")   Wt 98.4 kg (217 lb)   SpO2 94%   BMI 33.98 kg/m²   Wt Readings from Last 3 Encounters:   11/18/21 98.4 kg (217 lb)   09/24/21 96.2 kg (212 lb)   07/30/21 95.6 kg (210 lb 12.8 oz)         Vitals reviewed.   Constitutional:       Appearance: Well-developed and " well-groomed. Obese.   Eyes:      General: No scleral icterus.     Comments: Wears glasses   Neck:      Vascular: No JVD.   Pulmonary:      Effort: Pulmonary effort is normal.      Breath sounds: Normal breath sounds.   Cardiovascular:      Bradycardia present. Occasional ectopic beats. Regular rhythm.      No gallop.   Pulses:     Intact distal pulses.   Edema:     Peripheral edema absent.   Skin:     General: Skin is warm and dry.   Neurological:      Mental Status: Alert, oriented to person, place, and time and oriented to person, place and time.      Gait: Gait abnormal (mild unsteadiness).   Psychiatric:         Mood and Affect: Mood is depressed (mildly).         Behavior: Behavior is slowed (very slightly at first, improved during visit). Behavior is cooperative.         Result Review  Data Reviewed:  The following data was reviewed by: KERLINE Neville on 11/18/2021  Scan on 8/31/2021 by Cayetano Maldonado MD: [facility] LEXISCAN CARDIO STRESS TEST 8-  Scan on 8/31/2021 by Cayetano Maldonado MD: [facility] ECHO 8-  Scan on 8/5/2021 by Jeanette Mcgraw APRN: CMP,LIPID,TSH,T4,A1C,CBC   Scan on 6/16/2021 by Jeanette Mcgraw APRN: US CAROTID/ [facility]/ 6-               Assessment and Plan   Problem List Items Addressed This Visit        Cardiac and Vasculature    Bradycardia - Primary (Chronic)    Current Assessment & Plan     Remains low grade with no clear indication of chronotropic incompetence on previous Holter with max HR up to 117 in a fairly sedentary individual. However, given persistent symptoms s/p ablation will await results of repeat Holter and await Dr. De La Pza's (EP) recommendations regarding possible pacemaker.         Coronary artery disease (Chronic)    Overview     abnormal nuclear stress 6/2018 (mild inferior and septal hypokinesis)  Low risk nuclear stress 8/2021         Current Assessment & Plan     Continue medical management and risk factor modification. Call if  chest discomfort.         Frequent PVCs (Chronic)    Current Assessment & Plan     Awaiting repeat Holter results and Dr. De La Paz's recommendations whether to implant pacemaker. She remains symptomatic with fatigue and dizziness but not having near syncope or syncope at this time with low grade bradycardia - symptoms seem to be out of proportion to bradycardia. If no cardiac cause of symptoms may need to have neurology re-evaluate.         Hypercholesterolemia (Chronic)    Current Assessment & Plan     Well controlled with simvastatin per 8/2021 labs with normal LFTs. Continue present therapy.          Nonsustained ventricular tachycardia (HCC) (Chronic)    Overview     Nonsustained up to 4 beats per 5/2018 Holter         Current Assessment & Plan     Await repeat Holter results s/p ablation. May need to consider pacemaker for sick sinus syndrome/aydee-tachy syndrome. Normal systolic function per echo and low risk nuclear stress. Normal electrolytes. Normal ADONAY on RA. Await EP recommendations.          Supraventricular tachycardia (HCC) (Chronic)    Overview     Nonsustained per 5/2018 Holter         Current Assessment & Plan     Denies significant palpitations. Await repeat Holter results and EP recommendations.Normal ADONAY on RA. Normal electrolytes. Low risk nuclear stress.             Endocrine and Metabolic    Obesity (BMI 30.0-34.9) (Chronic)    Current Assessment & Plan     Patient's (Body mass index is 33.98 kg/m².) indicates that they are obese (BMI >30) with health conditions that include hypertension, coronary heart disease and dyslipidemias . Weight is worsening. BMI is is above average; BMI management plan is completed. We discussed low calorie, low carb based diet program, portion control and increasing exercise.              Patient was given instructions and counseling regarding her condition or for health maintenance advice. Please see specific information pulled into the AVS if appropriate.      Encouraged her to contact Dr. De La Paz's office and Dr. Beyer's regarding Namenda/Keppra recommendations.     Recommended she contact PCP about depresion. She may need an additional antidepressant. Suspect some of her irritability is secondary to Keppra/seizures.    Follow Up :   Return in about 3 months (around 2/18/2022) for Recheck.             Jeanette Mcgraw, APRN, ACNP-BC, CHFN-BC

## 2021-11-18 NOTE — ASSESSMENT & PLAN NOTE
Awaiting repeat Holter results and Dr. De La Paz's recommendations whether to implant pacemaker. She remains symptomatic with fatigue and dizziness but not having near syncope or syncope at this time with low grade bradycardia - symptoms seem to be out of proportion to bradycardia. If no cardiac cause of symptoms may need to have neurology re-evaluate.

## 2021-11-18 NOTE — ASSESSMENT & PLAN NOTE
Await repeat Holter results s/p ablation. May need to consider pacemaker for sick sinus syndrome/aydee-tachy syndrome. Normal systolic function per echo and low risk nuclear stress. Normal electrolytes. Normal ADONAY on RA. Await EP recommendations.

## 2021-11-18 NOTE — ASSESSMENT & PLAN NOTE
Patient's (Body mass index is 33.98 kg/m².) indicates that they are obese (BMI >30) with health conditions that include hypertension, coronary heart disease and dyslipidemias . Weight is worsening. BMI is is above average; BMI management plan is completed. We discussed low calorie, low carb based diet program, portion control and increasing exercise.

## 2021-11-29 NOTE — PROGRESS NOTES
RECEIVED HOLTER MONITOR FROM DR WILSON. NO RECOMMENDATIONS WERE ATTACHED THAT I COULD TELL? I ROUTED TO YOU FOR REVIEW.

## 2021-12-22 ENCOUNTER — TELEPHONE (OUTPATIENT)
Dept: CARDIOLOGY | Facility: CLINIC | Age: 76
End: 2021-12-22

## 2021-12-22 NOTE — TELEPHONE ENCOUNTER
----- Message from KERLINE Neville sent at 11/29/2021 12:41 PM CST -----  Check with Dr. De La Paz's office to see if he recommended anything - she and  are convinced she needs a pacemaker - symptomatic bradycardia/sinus node dysfunction? TX!  ----- Message -----  From: Ines Ramsay RegSched Rep  Sent: 11/29/2021  11:21 AM CST  To: KERLINE Neville        ----- Message -----  From: Jeanette Mcgraw APRN  Sent: 11/18/2021   4:24 PM CST  To: Marquita Nava    Request copy of recent Holter results from Dr. De La Paz and his recommendations (when available). TX!

## 2021-12-22 NOTE — TELEPHONE ENCOUNTER
Called and left vm for dr zay velásquez nurse, regarding recommendations after patient wore holter monitor. Please see george note if he called the office back.   thanks

## 2021-12-29 ENCOUNTER — TELEPHONE (OUTPATIENT)
Dept: CARDIOLOGY | Facility: CLINIC | Age: 76
End: 2021-12-29

## 2021-12-29 NOTE — TELEPHONE ENCOUNTER
Called [facility], frankie stated patient is scheduled tomorrow 12-30-21. Since you are off tomorrow she said [facility] cardiology will have to read.

## 2021-12-30 NOTE — TELEPHONE ENCOUNTER
"Patient was made aware of appointment for treadmill stress test by dr collazo office. I called and spoke to patients  who stated \"they did not attend appt for stress test because they are seeking a second opinion\".  Advised him that I would notify you.    Thank you  "

## 2021-12-30 NOTE — TELEPHONE ENCOUNTER
Let Dr De La Paz’s office know as well. Dr De La Paz was most likely doing the treadmill stress test to see if she needs a pacemaker or not. Tx!

## 2023-12-11 ENCOUNTER — OFFICE VISIT (OUTPATIENT)
Dept: NEUROLOGY | Age: 78
End: 2023-12-11
Payer: MEDICARE

## 2023-12-11 VITALS
HEIGHT: 67 IN | OXYGEN SATURATION: 97 % | WEIGHT: 210 LBS | HEART RATE: 61 BPM | SYSTOLIC BLOOD PRESSURE: 140 MMHG | DIASTOLIC BLOOD PRESSURE: 71 MMHG | BODY MASS INDEX: 32.96 KG/M2

## 2023-12-11 DIAGNOSIS — R41.3 MEMORY LOSS: ICD-10-CM

## 2023-12-11 DIAGNOSIS — R41.3 MEMORY LOSS: Primary | ICD-10-CM

## 2023-12-11 LAB
ALBUMIN SERPL-MCNC: 4.2 G/DL (ref 3.5–5.2)
ALP SERPL-CCNC: 91 U/L (ref 35–104)
ALT SERPL-CCNC: 12 U/L (ref 5–33)
ANION GAP SERPL CALCULATED.3IONS-SCNC: 8 MMOL/L (ref 7–19)
AST SERPL-CCNC: 14 U/L (ref 5–32)
BASOPHILS # BLD: 0.1 K/UL (ref 0–0.2)
BASOPHILS NFR BLD: 0.7 % (ref 0–1)
BILIRUB SERPL-MCNC: 0.3 MG/DL (ref 0.2–1.2)
BUN SERPL-MCNC: 12 MG/DL (ref 8–23)
CALCIUM SERPL-MCNC: 9.7 MG/DL (ref 8.8–10.2)
CHLORIDE SERPL-SCNC: 103 MMOL/L (ref 98–111)
CO2 SERPL-SCNC: 30 MMOL/L (ref 22–29)
CREAT SERPL-MCNC: 0.7 MG/DL (ref 0.5–0.9)
CRP SERPL HS-MCNC: <0.3 MG/DL (ref 0–0.5)
EOSINOPHIL # BLD: 0.3 K/UL (ref 0–0.6)
EOSINOPHIL NFR BLD: 4.2 % (ref 0–5)
ERYTHROCYTE [DISTWIDTH] IN BLOOD BY AUTOMATED COUNT: 13.5 % (ref 11.5–14.5)
ERYTHROCYTE [SEDIMENTATION RATE] IN BLOOD BY WESTERGREN METHOD: 10 MM/HR (ref 0–25)
GLUCOSE SERPL-MCNC: 82 MG/DL (ref 74–109)
HCT VFR BLD AUTO: 45.8 % (ref 37–47)
HGB BLD-MCNC: 14.9 G/DL (ref 12–16)
IMM GRANULOCYTES # BLD: 0 K/UL
LYMPHOCYTES # BLD: 2.6 K/UL (ref 1.1–4.5)
LYMPHOCYTES NFR BLD: 33.9 % (ref 20–40)
MCH RBC QN AUTO: 28.5 PG (ref 27–31)
MCHC RBC AUTO-ENTMCNC: 32.5 G/DL (ref 33–37)
MCV RBC AUTO: 87.6 FL (ref 81–99)
MONOCYTES # BLD: 0.6 K/UL (ref 0–0.9)
MONOCYTES NFR BLD: 8.2 % (ref 0–10)
NEUTROPHILS # BLD: 4.1 K/UL (ref 1.5–7.5)
NEUTS SEG NFR BLD: 52.9 % (ref 50–65)
PLATELET # BLD AUTO: 256 K/UL (ref 130–400)
PMV BLD AUTO: 10.6 FL (ref 9.4–12.3)
POTASSIUM SERPL-SCNC: 4.1 MMOL/L (ref 3.5–5)
PROT SERPL-MCNC: 7.2 G/DL (ref 6.6–8.7)
RBC # BLD AUTO: 5.23 M/UL (ref 4.2–5.4)
SODIUM SERPL-SCNC: 141 MMOL/L (ref 136–145)
TSH SERPL DL<=0.005 MIU/L-ACNC: 3.43 UIU/ML (ref 0.35–5.5)
VIT B12 SERPL-MCNC: 816 PG/ML (ref 211–946)
WBC # BLD AUTO: 7.7 K/UL (ref 4.8–10.8)

## 2023-12-11 PROCEDURE — G8417 CALC BMI ABV UP PARAM F/U: HCPCS | Performed by: NURSE PRACTITIONER

## 2023-12-11 PROCEDURE — 99203 OFFICE O/P NEW LOW 30 MIN: CPT | Performed by: NURSE PRACTITIONER

## 2023-12-11 PROCEDURE — 1036F TOBACCO NON-USER: CPT | Performed by: NURSE PRACTITIONER

## 2023-12-11 PROCEDURE — G8427 DOCREV CUR MEDS BY ELIG CLIN: HCPCS | Performed by: NURSE PRACTITIONER

## 2023-12-11 PROCEDURE — G8484 FLU IMMUNIZE NO ADMIN: HCPCS | Performed by: NURSE PRACTITIONER

## 2023-12-11 PROCEDURE — G8400 PT W/DXA NO RESULTS DOC: HCPCS | Performed by: NURSE PRACTITIONER

## 2023-12-11 PROCEDURE — 1090F PRES/ABSN URINE INCON ASSESS: CPT | Performed by: NURSE PRACTITIONER

## 2023-12-11 PROCEDURE — 1123F ACP DISCUSS/DSCN MKR DOCD: CPT | Performed by: NURSE PRACTITIONER

## 2023-12-11 RX ORDER — NITROFURANTOIN MACROCRYSTALS 50 MG/1
CAPSULE ORAL
COMMUNITY
Start: 2023-10-22

## 2023-12-11 RX ORDER — MEMANTINE HYDROCHLORIDE 10 MG/1
TABLET ORAL
COMMUNITY
Start: 2023-11-26

## 2023-12-11 RX ORDER — FINASTERIDE 1 MG/1
TABLET, FILM COATED ORAL
COMMUNITY
Start: 2023-10-17

## 2023-12-11 RX ORDER — DULOXETIN HYDROCHLORIDE 20 MG/1
CAPSULE, DELAYED RELEASE ORAL
COMMUNITY
Start: 2023-11-16

## 2023-12-11 RX ORDER — MONTELUKAST SODIUM 10 MG/1
TABLET ORAL
COMMUNITY
Start: 2023-11-19

## 2023-12-11 NOTE — PROGRESS NOTES
REVIEW OF SYSTEMS    Constitutional: []Fever []Sweat []Chills [] Recent Injury [x] Denies all unless marked  HEENT:[]Headache  [] Head Injury/Hearing Loss  [] Sore Throat  [] Ear Ache/Dizziness  [x] Denies all unless marked  Spine:  [] Neck pain  [x] Back pain  [] Sciaticia  [x] Denies all unless marked  Cardiovascular:[]Heart Disease []Chest Pain [] Palpitations  [x] Denies all unless marked  Pulmonary: []Shortness of Breath []Cough   [x] Denies all unless marke  Gastrointestinal: []Nausea  []Vomiting  []Abdominal Pain  []Constipation  []Diarrhea  []Dark Bloody Stools  [x] Denies all unless marked  Psychiatric/Behavioral:[] Depression [] Anxiety [x] Denies all unless marked  Genitourinary:   [] Frequency  [] Urgency  [] Incontinence [] Pain with Urination  [x] Denies all unless marked  Extremities: []Pain  []Swelling  [x] Denies all unless marked  Musculoskeletal: [] Muscle Pain  [] Joint Pain  [] Arthritis [] Muscle Cramps [] Muscle Twitches  [x] Denies all unless marked  Sleep: [] Insomnia [] Snoring [] Restless Legs [] Sleep Apnea  [] Daytime Sleepiness  [x] Denies all unless marked  Skin:[] Rash [] Skin Discoloration [x] Denies all unless marked   Neurological: [x]Visual Disturbance/Memory Loss [] Loss of Balance [] Slurred Speech/Weakness [] Seizures  [] Vertigo/Dizziness [x] Denies all unless marked

## 2023-12-11 NOTE — PROGRESS NOTES
[facility] Office Note      Patient:   Lieutenant Morris  MR#:    [id number]  Account Number:                         YOB: 1945  Date of Evaluation:  12/11/2023  Time of Note:                          2:47 PM  Primary/Referring Physician:  Tequila Velazquez   Consulting Physician:  ELIZABETH Carlin    NEW PATIENT CONSULTATION      Chief Complaint   Patient presents with    New Patient    Memory Loss       HISTORY OF PRESENT ILLNESS    Lieutenant Morris is a 69y.o. year old female here for evaluation and treatment of cognitive problems that began around 2020. 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 Was on [facility] for sometime. She is accompanied by . The family and the patient identify problems with changes in primarily short term memory, not clearly long term memory. They do note word recall difficulty. There is repetition of questions. She has noticed some changes to mood- feels like she is angry more. There is some underlying anxiety and depression. No clear hallucinations. There is a degree of poor sleep- will have to use the bathroom and can sometimes not go back to sleep. She has been up since 2 am this morning. Takes cat naps during the day. She is cooking without issue.  oversees the finances. No issues with ADL's. Appetite is increased-has been eating more sweets. Was previously following [address] Weight has increased. She is not driving. There is not a clear tremor. There is some gait abnormality with imbalance. There are falls, last fall was from coming down a ladder. She is monitoring the use of medications without issue- is using a bag system. Medications for dementia have included:  Namenda, started this about 1 year. Diagnostic studies have included Pet scan at [facility] that was negative. There is no known stroke history. No known family history of memory loss. History reviewed.  No pertinent past

## 2023-12-13 LAB
ARSENIC BLD-MCNC: <10 UG/L
B BURGDOR IGG SER QL IB: NEGATIVE
B BURGDOR IGM SER QL IB: NEGATIVE
CADMIUM BLD-MCNC: <1 UG/L
LEAD BLD-MCNC: <2 UG/DL
MERCURY BLD-MCNC: <2.5 UG/L
METHYLMALONATE SERPL-SCNC: 0.13 UMOL/L (ref 0–0.4)
NUCLEAR IGG SER QL IA: NORMAL
VIT B1 BLD-MCNC: 154 NMOL/L (ref 70–180)

## 2024-02-19 ENCOUNTER — OFFICE VISIT (OUTPATIENT)
Dept: NEUROLOGY | Age: 79
End: 2024-02-19
Payer: MEDICARE

## 2024-02-19 VITALS
SYSTOLIC BLOOD PRESSURE: 133 MMHG | BODY MASS INDEX: 32.96 KG/M2 | HEART RATE: 57 BPM | DIASTOLIC BLOOD PRESSURE: 70 MMHG | WEIGHT: 210 LBS | OXYGEN SATURATION: 97 % | HEIGHT: 67 IN

## 2024-02-19 DIAGNOSIS — I67.9 CEREBROVASCULAR SMALL VESSEL DISEASE: ICD-10-CM

## 2024-02-19 DIAGNOSIS — R41.3 MEMORY LOSS: Primary | ICD-10-CM

## 2024-02-19 PROCEDURE — G8400 PT W/DXA NO RESULTS DOC: HCPCS | Performed by: NURSE PRACTITIONER

## 2024-02-19 PROCEDURE — G8427 DOCREV CUR MEDS BY ELIG CLIN: HCPCS | Performed by: NURSE PRACTITIONER

## 2024-02-19 PROCEDURE — 1036F TOBACCO NON-USER: CPT | Performed by: NURSE PRACTITIONER

## 2024-02-19 PROCEDURE — G8417 CALC BMI ABV UP PARAM F/U: HCPCS | Performed by: NURSE PRACTITIONER

## 2024-02-19 PROCEDURE — 99214 OFFICE O/P EST MOD 30 MIN: CPT | Performed by: NURSE PRACTITIONER

## 2024-02-19 PROCEDURE — 1123F ACP DISCUSS/DSCN MKR DOCD: CPT | Performed by: NURSE PRACTITIONER

## 2024-02-19 PROCEDURE — G8484 FLU IMMUNIZE NO ADMIN: HCPCS | Performed by: NURSE PRACTITIONER

## 2024-02-19 PROCEDURE — 1090F PRES/ABSN URINE INCON ASSESS: CPT | Performed by: NURSE PRACTITIONER

## 2024-02-19 RX ORDER — VITAMIN E 268 MG
400 CAPSULE ORAL DAILY
COMMUNITY

## 2024-02-19 RX ORDER — TURMERIC ROOT EXTRACT 500 MG
TABLET ORAL
COMMUNITY

## 2024-02-19 RX ORDER — ASCORBIC ACID 1000 MG
TABLET ORAL DAILY
COMMUNITY

## 2024-02-19 RX ORDER — MULTIVIT WITH MINERALS/LUTEIN
1000 TABLET ORAL DAILY
COMMUNITY

## 2024-02-19 RX ORDER — FAMOTIDINE 40 MG/1
TABLET, FILM COATED ORAL
COMMUNITY
Start: 2024-01-28

## 2024-02-19 NOTE — PROGRESS NOTES
[facility] Office Note      Patient:   Kimberly Ferraro  MR#:    [id number]  Account Number:                         YOB: 1945  Date of Evaluation:  2/19/2024  Time of Note:                          3:26 PM  Primary/Referring Physician:  Giuliana Adrian   Consulting Physician:  ELIZABETH Hall    FOLLOW-UP      Chief Complaint   Patient presents with    Follow-up    Memory Loss       HISTORY OF PRESENT ILLNESS    Kimberly Ferraro is a 78 y.o. year old female here for follow up of memory loss that started in 2020. She is unchanged. Nothing new or concerning. MRI brain with senescent changes, labs normal. Was previously diagnosed with West Nile Virus at this time and had seizure like activity- treated at [facility]. Was on Keppra for sometime. She is accompanied by . The family and the patient identify problems with changes in primarily short term memory, not clearly long term memory. They do note word recall difficulty. There is repetition of questions. She has noticed some changes to mood- feels like she is angry more. There is some underlying anxiety and depression. No clear hallucinations.  There is a degree of poor sleep that has improved, is using melatonin- will have to use the bathroom and can sometimes not go back to sleep. She has been up since 2 am this morning. Takes cat naps during the day. She is cooking without issue.  oversees the finances.  No issues with ADL's. Appetite is increased-has been eating more sweets. Was previously following GOLO diet. Weight has increased. She is not driving. There is not a clear tremor.  There is some gait abnormality with imbalance.  There are falls, last fall was from coming down a ladder. She is monitoring the use of medications without issue- is using a bag system.  Medications for dementia have included:  Namenda, started this about 1 year. Diagnostic studies have included Pet scan at [place] that was negative. There is no known

## 2024-02-19 NOTE — PROGRESS NOTES
REVIEW OF SYSTEMS    Constitutional: []Fever []Sweat []Chills [] Recent Injury [x] Denies all unless marked  HEENT:[]Headache  [] Head Injury/Hearing Loss  [] Sore Throat  [] Ear Ache/Dizziness  [x] Denies all unless marked  Spine:  [] Neck pain  [] Back pain  [] Sciaticia  [x] Denies all unless marked  Cardiovascular:[]Heart Disease []Chest Pain [] Palpitations  [x] Denies all unless marked  Pulmonary: []Shortness of Breath []Cough   [x] Denies all unless marke  Gastrointestinal: []Nausea  []Vomiting  []Abdominal Pain  []Constipation  []Diarrhea  []Dark Bloody Stools  [x] Denies all unless marked  Psychiatric/Behavioral:[] Depression [] Anxiety [x] Denies all unless marked  Genitourinary:   [] Frequency  [] Urgency  [] Incontinence [] Pain with Urination  [x] Denies all unless marked  Extremities: []Pain  []Swelling  [x] Denies all unless marked  Musculoskeletal: [] Muscle Pain  [] Joint Pain  [] Arthritis [] Muscle Cramps [] Muscle Twitches  [x] Denies all unless marked  Sleep: [] Insomnia [] Snoring [] Restless Legs [] Sleep Apnea  [] Daytime Sleepiness  [x] Denies all unless marked  Skin:[] Rash [] Skin Discoloration [x] Denies all unless marked   Neurological: [x]Visual Disturbance/Memory Loss [x] Loss of Balance [] Slurred Speech/Weakness [] Seizures  [] Vertigo/Dizziness [x] Denies all unless marked

## 2024-08-19 ENCOUNTER — OFFICE VISIT (OUTPATIENT)
Dept: NEUROLOGY | Age: 79
End: 2024-08-19
Payer: MEDICARE

## 2024-08-19 VITALS
BODY MASS INDEX: 32.96 KG/M2 | DIASTOLIC BLOOD PRESSURE: 72 MMHG | HEIGHT: 67 IN | OXYGEN SATURATION: 94 % | HEART RATE: 49 BPM | WEIGHT: 210 LBS | SYSTOLIC BLOOD PRESSURE: 123 MMHG

## 2024-08-19 DIAGNOSIS — G47.00 INSOMNIA, UNSPECIFIED TYPE: ICD-10-CM

## 2024-08-19 DIAGNOSIS — Z79.899 MEDICATION MANAGEMENT: ICD-10-CM

## 2024-08-19 DIAGNOSIS — I67.9 CEREBROVASCULAR SMALL VESSEL DISEASE: ICD-10-CM

## 2024-08-19 DIAGNOSIS — F03.B0 MODERATE DEMENTIA, UNSPECIFIED DEMENTIA TYPE, UNSPECIFIED WHETHER BEHAVIORAL, PSYCHOTIC, OR MOOD DISTURBANCE OR ANXIETY (HCC): Primary | ICD-10-CM

## 2024-08-19 PROCEDURE — 1090F PRES/ABSN URINE INCON ASSESS: CPT | Performed by: NURSE PRACTITIONER

## 2024-08-19 PROCEDURE — 99214 OFFICE O/P EST MOD 30 MIN: CPT | Performed by: NURSE PRACTITIONER

## 2024-08-19 PROCEDURE — 1036F TOBACCO NON-USER: CPT | Performed by: NURSE PRACTITIONER

## 2024-08-19 PROCEDURE — G8427 DOCREV CUR MEDS BY ELIG CLIN: HCPCS | Performed by: NURSE PRACTITIONER

## 2024-08-19 PROCEDURE — 1123F ACP DISCUSS/DSCN MKR DOCD: CPT | Performed by: NURSE PRACTITIONER

## 2024-08-19 PROCEDURE — G8400 PT W/DXA NO RESULTS DOC: HCPCS | Performed by: NURSE PRACTITIONER

## 2024-08-19 PROCEDURE — G8417 CALC BMI ABV UP PARAM F/U: HCPCS | Performed by: NURSE PRACTITIONER

## 2024-08-19 RX ORDER — DONEPEZIL HYDROCHLORIDE 10 MG/1
TABLET, FILM COATED ORAL
COMMUNITY
Start: 2024-07-02

## 2024-08-19 RX ORDER — GABAPENTIN 300 MG/1
300 CAPSULE ORAL DAILY
COMMUNITY
Start: 2024-07-24

## 2024-08-19 NOTE — PROGRESS NOTES
REVIEW OF SYSTEMS    Constitutional: []Fever []Sweats []Chills [] Recent Injury [x] Denies all unless marked  HEENT:[]Headache  [] Head Injury [] Hearing Loss  [] Sore Throat  [] Ear Ache [x] Denies all unless marked  Spine:  [] Neck pain  [] Back pain  [] Sciaticia  [x] Denies all unless marked  Cardiovascular:[]Heart Disease []Palpitations [] Chest Pain   [x] Denies all unless marked  Pulmonary: []Shortness of Breath []Cough   [x] Denies all unless marked  Psychiatric/Behavioral:[] Depression [] Anxiety [x] Denies all unless marked  Gastrointestinal: []Nausea  []Vomiting  []Abdominal Pain  []Constipation  []Diarrhea  [x] Denies all unless marked  Genitourinary:   [] Frequency  [] Urgency  [] Dysuria [] Incontinence  [x] Denies all unless marked  Extremities: []Pain  []Swelling  [x] Denies all unless marked  Musculoskeletal: [] Myalgias  [] Joint Pain  [] Arthritis [] Muscle Cramps [] Muscle Twitches  [x] Denies all unless marked  Sleep: []Insomnia[]Snoring []Restless Legs  []Sleep Apnea  []Daytime Sleepiness  [x] Denies all unless marked  Skin:[] Rash [] Color Change [x] Denies all unless marked   Neurological:[]Visual Disturbance [] Memory Loss []Loss of Balance []Slurred Speech []Weakness []Seizures  [] Dizziness [x] Denies all unless marked      
.  They do feel she has worsened from memory standpoint, primarily short-term.  No new safety issues.  Mood largely stable.  As previously discussed in  she was in [facility] with diagnosis of West Nile virus with neurologic involvement.  Reports she had seizure activity at this time.  No new epileptic events for quite some time.  She is still having issues with insomnia.  Is taking melatonin, unsure what dose.  She is on maximum dose of Namenda, has been for some time now. She has had previous PET scan at [place] in  that was negative.  Original MoCA score 15/30 indicating moderate cognitive impairment.  4/5 for executive function, impairment to most domains.  0/5 for delayed recall.  Repeat MoCA today 14/30, 3/5 for exacting function, 0/5 for delayed recall.  MRI brain 2024 with generalized atrophy and mild . Labs negative for secondary source. Not on sedating medications.  Recently completed blood biomarker testing with primary care with results consistent for the presence of Alzheimer's disease pathology.  Exam nonfocal.  Discussed Alzheimer's disease at length.  Unfortunately given moderate cognitive impairment she does not qualify for Leqembi.  It is also of note that we would need CSF testing or amyloid beta PET scan for definitive diagnosis.  I do feel that she has neurodegenerative disease, continue maximum dose of Namenda.  Unable to add in donepezil given persistent bradycardia.  Discussed daily activities that she can do to improve brain health.        ICD-10-CM    1. Moderate dementia, unspecified dementia type, unspecified whether behavioral, psychotic, or mood disturbance or anxiety (HCC)  F03.B0       2. Cerebrovascular small vessel disease  I67.9       3. Insomnia, unspecified type  G47.00       4. Medication management  Z79.899           PLAN:  Continue Namenda 10 mg BID.   Donepezil contraindicated given heart rate.   Does not qualify for Leqembi given MoCA score,

## 2025-04-17 ENCOUNTER — OFFICE VISIT (OUTPATIENT)
Dept: NEUROLOGY | Age: 80
End: 2025-04-17
Payer: MEDICARE

## 2025-04-17 VITALS
SYSTOLIC BLOOD PRESSURE: 127 MMHG | HEIGHT: 67 IN | DIASTOLIC BLOOD PRESSURE: 67 MMHG | OXYGEN SATURATION: 97 % | WEIGHT: 210 LBS | BODY MASS INDEX: 32.96 KG/M2 | HEART RATE: 48 BPM

## 2025-04-17 DIAGNOSIS — F03.B0 MODERATE DEMENTIA, UNSPECIFIED DEMENTIA TYPE, UNSPECIFIED WHETHER BEHAVIORAL, PSYCHOTIC, OR MOOD DISTURBANCE OR ANXIETY (HCC): Primary | ICD-10-CM

## 2025-04-17 DIAGNOSIS — I67.9 CEREBROVASCULAR SMALL VESSEL DISEASE: ICD-10-CM

## 2025-04-17 PROCEDURE — G8427 DOCREV CUR MEDS BY ELIG CLIN: HCPCS | Performed by: NURSE PRACTITIONER

## 2025-04-17 PROCEDURE — 1090F PRES/ABSN URINE INCON ASSESS: CPT | Performed by: NURSE PRACTITIONER

## 2025-04-17 PROCEDURE — 1036F TOBACCO NON-USER: CPT | Performed by: NURSE PRACTITIONER

## 2025-04-17 PROCEDURE — 1159F MED LIST DOCD IN RCRD: CPT | Performed by: NURSE PRACTITIONER

## 2025-04-17 PROCEDURE — 1160F RVW MEDS BY RX/DR IN RCRD: CPT | Performed by: NURSE PRACTITIONER

## 2025-04-17 PROCEDURE — 99214 OFFICE O/P EST MOD 30 MIN: CPT | Performed by: NURSE PRACTITIONER

## 2025-04-17 PROCEDURE — G8400 PT W/DXA NO RESULTS DOC: HCPCS | Performed by: NURSE PRACTITIONER

## 2025-04-17 PROCEDURE — 1123F ACP DISCUSS/DSCN MKR DOCD: CPT | Performed by: NURSE PRACTITIONER

## 2025-04-17 PROCEDURE — G8417 CALC BMI ABV UP PARAM F/U: HCPCS | Performed by: NURSE PRACTITIONER

## 2025-04-17 RX ORDER — DONEPEZIL HYDROCHLORIDE 5 MG/1
5 TABLET, FILM COATED ORAL NIGHTLY
Qty: 90 TABLET | Refills: 2 | Status: SHIPPED | OUTPATIENT
Start: 2025-04-17

## 2025-04-17 RX ORDER — ONDANSETRON 8 MG/1
TABLET, ORALLY DISINTEGRATING ORAL
COMMUNITY
Start: 2025-03-04

## 2025-04-17 NOTE — PROGRESS NOTES
[facility] Office Note      Patient:   Kimberly Ferraro  MR#:    [id number]  Account Number:                         YOB: 1945  Date of Evaluation:  4/17/2025  Time of Note:                          2:13 PM  Primary/Referring Physician:  Giuliana Adrian APRN - CNP   Consulting Physician:  ELIZABETH Hall    FOLLOW-UP      Chief Complaint   Patient presents with    Follow-up    Memory Loss     Pt states no changes with her memory.        HISTORY OF PRESENT ILLNESS    Kimberly Ferraro is a 79 y.o. year old female here for follow up of memory loss that started in 2020.  Here with .  She is largely stable.  Mood stable.  Appetite has decreased, on semaglutide and tolerating well.  No new safety concerns. Primary care has completed amyloid beta blood testing and it is consistent with Alzheimer's disease pathology. Previous MRI brain with senescent changes, labs normal. Was previously diagnosed with West Nile Virus in 2020 and had seizure like activity- treated at [facility]. Was on Keppra for sometime.  As previously discussed the family and the patient identify problems with changes in primarily short term memory, not clearly long term memory. They do note word recall difficulty. There is repetition of questions. She has noticed some changes to mood- feels like she is angry more. There is some underlying anxiety and depression. No clear hallucinations.  There is a degree of poor sleep that has improved, is using melatonin- will have to use the bathroom and can sometimes not go back to sleep. Takes cat naps during the day. She is cooking without issue.  oversees the finances.  No issues with ADL's. Appetite was increased-had been eating more sweets. Was previously following GOLO diet. Weight has increased. She is not driving. There is not a clear tremor.  There is some gait abnormality with imbalance.  No recent falls.  She is monitoring the use of medications without issue- is using a  Pt resting in bed, [id number]. Report called to the floor. Pt to go up on tele. Pt stated he was hungry and offered him a sandwhich which she refused refused.      Goltz, Patrick, RN  08/17/21 [id number]